# Patient Record
Sex: MALE | Race: WHITE | NOT HISPANIC OR LATINO | Employment: FULL TIME | URBAN - NONMETROPOLITAN AREA
[De-identification: names, ages, dates, MRNs, and addresses within clinical notes are randomized per-mention and may not be internally consistent; named-entity substitution may affect disease eponyms.]

---

## 2017-06-23 ENCOUNTER — OFFICE VISIT (OUTPATIENT)
Dept: INTERNAL MEDICINE | Facility: CLINIC | Age: 42
End: 2017-06-23

## 2017-06-23 VITALS
BODY MASS INDEX: 32.65 KG/M2 | WEIGHT: 233.19 LBS | DIASTOLIC BLOOD PRESSURE: 78 MMHG | TEMPERATURE: 97.7 F | OXYGEN SATURATION: 98 % | HEART RATE: 96 BPM | HEIGHT: 71 IN | RESPIRATION RATE: 16 BRPM | SYSTOLIC BLOOD PRESSURE: 126 MMHG

## 2017-06-23 DIAGNOSIS — R21 RASH OF NECK: ICD-10-CM

## 2017-06-23 DIAGNOSIS — Z76.89 ENCOUNTER TO ESTABLISH CARE: Primary | ICD-10-CM

## 2017-06-23 PROCEDURE — 99203 OFFICE O/P NEW LOW 30 MIN: CPT | Performed by: NURSE PRACTITIONER

## 2017-06-23 PROCEDURE — 90715 TDAP VACCINE 7 YRS/> IM: CPT | Performed by: NURSE PRACTITIONER

## 2017-06-23 PROCEDURE — 90471 IMMUNIZATION ADMIN: CPT | Performed by: NURSE PRACTITIONER

## 2017-06-23 NOTE — PROGRESS NOTES
Chief Complaint / Reason:      Chief Complaint   Patient presents with   • Establish Care     rash and bumps to neck       Subjective   Patient presents today to establish care and complaints of rash and bumps to neck. Patient denies fever, shortness of breath, chest pain, or heart palpitations.  Rash   This is a new problem. The current episode started in the past 7 days. The problem has been gradually worsening since onset. Location: back of neck. The rash is characterized by itchiness, redness and swelling. He was exposed to nothing (patent states that he does not know of anything that he has been exposed to but he and his family were outdoors recently  enjoying nature ). Pertinent negatives include no congestion, fatigue or fever. Past treatments include anti-itch cream. The treatment provided no relief.       History taken from: patient    PMH/FH/Social History were reviewed and updated appropriately in the electronic medical record.     Review of Systems:   Review of Systems   Constitutional: Negative.  Negative for fatigue and fever.   HENT: Negative.  Negative for congestion.    Eyes: Negative.    Respiratory: Negative.    Cardiovascular: Negative.    Gastrointestinal: Negative.    Endocrine: Negative.    Genitourinary: Negative.    Musculoskeletal: Negative.    Skin: Positive for rash.   Allergic/Immunologic: Negative.    Neurological: Negative.    Hematological: Negative.    Psychiatric/Behavioral: Negative.      All other systems were reviewed and are negative.  Exceptions are noted in the subjective or above.      Objective     Vital Signs  Vitals:    06/23/17 1621   BP: 126/78   Pulse: 96   Resp: 16   Temp: 97.7 °F (36.5 °C)   SpO2: 98%       Body mass index is 32.52 kg/(m^2).    Physical Exam   Constitutional: He is oriented to person, place, and time. He appears well-developed and well-nourished.   HENT:   Head: Normocephalic.   Eyes: Pupils are equal, round, and reactive to light.   Neck: Normal  range of motion. Neck supple.   Cardiovascular: Normal rate, regular rhythm, normal heart sounds and intact distal pulses.    Pulmonary/Chest: Effort normal and breath sounds normal.   Abdominal: Soft. Bowel sounds are normal. There is no tenderness.   Musculoskeletal: Normal range of motion.   Lymphadenopathy:     He has no cervical adenopathy.   Neurological: He is alert and oriented to person, place, and time.   Skin: Skin is warm and dry. Purpura and rash noted. There is erythema.        Psychiatric: He has a normal mood and affect. His behavior is normal. Judgment and thought content normal.   Nursing note and vitals reviewed.            Medication Review:     Current Outpatient Prescriptions:   •  triamcinolone (KENALOG) 0.1 % ointment, Apply  topically 2 (Two) Times a Day for 7 days., Disp: 15 g, Rfl: 0    Assessment/Plan   Shon was seen today for establish care.    Diagnoses and all orders for this visit:    Encounter to establish care  Counseling was given to patient for the following topics: instructions for management, risk factor reductions, patient and family education, risks and benefits of treatment options and importance of treatment compliance . Total time of the encounter was 30 minutes and 18 minutes was spend counseling.    --Nutrition: Stressed importance of moderation in sodium/caffeine intake, saturated fat and cholesterol, caloric balance, sufficient intake of fresh fruits, vegetables, fiber, calcium, iron, and 1 g folate supplementation if of childbearing age (if applicable).   --Discussed the issue of calcium supplement, and the daily use of baby aspirin (if applicable).  --Exercise: Stressed the importance of regular exercise and maintaining healthy weight.   --Substance Abuse: Discussed cessation/primary prevention of tobacco (if applicable), alcohol, or other drug use (if applicable); driving or other dangerous activities under the influence; availability of treatment for abuse.    --Injury prevention: Discussed safety belts, safety helmets, smoke detector, fall prevention.   --Dental health: Discussed importance of regular tooth brushing, flossing, and dental visits.  --Immunizations reviewed.  --Discussed benefits of health maintenance and its components.  --Stress management.  --Vision screening recommended    Rash of neck  -     triamcinolone (KENALOG) 0.1 % ointment; Apply  topically 2 (Two) Times a Day for 7 days.  Avoid scratching area and good handwashing advised.   Recommend patient take OTC antihistamines as needed.   Dicussed worsening signs and symptoms.   Other orders  -     Tdap Vaccine Greater Than or Equal To 6yo IM    Follow up in 1 year for annual physical and PRN.     Charlotte Mitchell, APRN  06/23/2017

## 2018-04-13 ENCOUNTER — OFFICE VISIT (OUTPATIENT)
Dept: INTERNAL MEDICINE | Facility: CLINIC | Age: 43
End: 2018-04-13

## 2018-04-13 ENCOUNTER — HOSPITAL ENCOUNTER (OUTPATIENT)
Dept: GENERAL RADIOLOGY | Facility: HOSPITAL | Age: 43
Discharge: HOME | End: 2018-04-13
Attending: NURSE PRACTITIONER | Admitting: NURSE PRACTITIONER

## 2018-04-13 VITALS
OXYGEN SATURATION: 97 % | HEIGHT: 71 IN | DIASTOLIC BLOOD PRESSURE: 92 MMHG | WEIGHT: 235.19 LBS | RESPIRATION RATE: 16 BRPM | HEART RATE: 92 BPM | SYSTOLIC BLOOD PRESSURE: 130 MMHG | BODY MASS INDEX: 32.93 KG/M2 | TEMPERATURE: 98.7 F

## 2018-04-13 DIAGNOSIS — Z82.49 FAMILY HISTORY OF HEART DISEASE: ICD-10-CM

## 2018-04-13 DIAGNOSIS — Z83.49 FAMILY HISTORY OF ENDOCRINE DISORDER: ICD-10-CM

## 2018-04-13 DIAGNOSIS — E66.9 CLASS 1 OBESITY WITH BODY MASS INDEX (BMI) OF 32.0 TO 32.9 IN ADULT, UNSPECIFIED OBESITY TYPE, UNSPECIFIED WHETHER SERIOUS COMORBIDITY PRESENT: ICD-10-CM

## 2018-04-13 DIAGNOSIS — R60.0 EDEMA OF LEFT LOWER EXTREMITY: ICD-10-CM

## 2018-04-13 DIAGNOSIS — R06.83 SNORING: Primary | ICD-10-CM

## 2018-04-13 DIAGNOSIS — R07.9 CHEST PAIN AT REST: ICD-10-CM

## 2018-04-13 DIAGNOSIS — R53.83 FATIGUE, UNSPECIFIED TYPE: ICD-10-CM

## 2018-04-13 PROCEDURE — 71046 X-RAY EXAM CHEST 2 VIEWS: CPT

## 2018-04-13 PROCEDURE — 99214 OFFICE O/P EST MOD 30 MIN: CPT | Performed by: NURSE PRACTITIONER

## 2018-04-13 PROCEDURE — 93000 ELECTROCARDIOGRAM COMPLETE: CPT | Performed by: NURSE PRACTITIONER

## 2018-04-13 RX ORDER — ERGOCALCIFEROL 1.25 MG/1
50000 CAPSULE ORAL WEEKLY
Qty: 4 CAPSULE | Refills: 2 | Status: SHIPPED | OUTPATIENT
Start: 2018-04-13 | End: 2022-02-10

## 2018-04-13 NOTE — TELEPHONE ENCOUNTER
Wife called to schedule an appt. Pt. told her that he has had some left arm pain and some chest pain. As far as she knows, no SOA, jaw pain etc. She doesn't think that he needs to go to the ER. I went ahead and schedule the appt.

## 2018-04-13 NOTE — PROGRESS NOTES
Please contact patient and let him know lab results and x-ray results.  Informed him that some of the labs are not back and that his vitamin D level is low we can send in medication or he can take over-the-counter vitamin D.  Also LDL which is bad cholesterol is elevated and needs to be a little below 100 and he needs to do dietary modifications to improve along with exercise.  Recommend low-fat low carbohydrate low cholesterol diet.

## 2018-04-14 LAB
25(OH)D3+25(OH)D2 SERPL-MCNC: <12.8 NG/ML
ALBUMIN SERPL-MCNC: 4.3 G/DL (ref 3.5–5)
ALBUMIN/GLOB SERPL: 1.2 G/DL (ref 1–2)
ALP SERPL-CCNC: 68 U/L (ref 38–126)
ALT SERPL-CCNC: 66 U/L (ref 13–69)
AST SERPL-CCNC: 38 U/L (ref 15–46)
BASOPHILS # BLD AUTO: 0.03 10*3/MM3 (ref 0–0.2)
BASOPHILS NFR BLD AUTO: 0.4 % (ref 0–2.5)
BILIRUB SERPL-MCNC: 0.5 MG/DL (ref 0.2–1.3)
BUN SERPL-MCNC: 16 MG/DL (ref 7–20)
BUN/CREAT SERPL: 17.8 (ref 6.3–21.9)
CALCIUM SERPL-MCNC: 9.9 MG/DL (ref 8.4–10.2)
CHLORIDE SERPL-SCNC: 103 MMOL/L (ref 98–107)
CHOLEST SERPL-MCNC: 184 MG/DL (ref 0–199)
CO2 SERPL-SCNC: 26 MMOL/L (ref 26–30)
CREAT SERPL-MCNC: 0.9 MG/DL (ref 0.6–1.3)
EOSINOPHIL # BLD AUTO: 0.22 10*3/MM3 (ref 0–0.7)
EOSINOPHIL NFR BLD AUTO: 3.2 % (ref 0–7)
ERYTHROCYTE [DISTWIDTH] IN BLOOD BY AUTOMATED COUNT: 12.8 % (ref 11.5–14.5)
GFR SERPLBLD CREATININE-BSD FMLA CKD-EPI: 112 ML/MIN/1.73
GFR SERPLBLD CREATININE-BSD FMLA CKD-EPI: 93 ML/MIN/1.73
GLOBULIN SER CALC-MCNC: 3.5 GM/DL
GLUCOSE SERPL-MCNC: 91 MG/DL (ref 74–98)
HBA1C MFR BLD: 5.4 %
HCT VFR BLD AUTO: 43.4 % (ref 42–52)
HDLC SERPL-MCNC: 29 MG/DL (ref 40–60)
HGB BLD-MCNC: 14.5 G/DL (ref 14–18)
IMM GRANULOCYTES # BLD: 0.03 10*3/MM3 (ref 0–0.06)
IMM GRANULOCYTES NFR BLD: 0.4 % (ref 0–0.6)
LDLC SERPL CALC-MCNC: 114 MG/DL (ref 0–99)
LYMPHOCYTES # BLD AUTO: 2.51 10*3/MM3 (ref 0.6–3.4)
LYMPHOCYTES NFR BLD AUTO: 36.9 % (ref 10–50)
MCH RBC QN AUTO: 30 PG (ref 27–31)
MCHC RBC AUTO-ENTMCNC: 33.4 G/DL (ref 30–37)
MCV RBC AUTO: 89.9 FL (ref 80–94)
MONOCYTES # BLD AUTO: 0.43 10*3/MM3 (ref 0–0.9)
MONOCYTES NFR BLD AUTO: 6.3 % (ref 0–12)
NEUTROPHILS # BLD AUTO: 3.59 10*3/MM3 (ref 2–6.9)
NEUTROPHILS NFR BLD AUTO: 52.8 % (ref 37–80)
NRBC BLD AUTO-RTO: 0 /100 WBC (ref 0–0)
PLATELET # BLD AUTO: 265 10*3/MM3 (ref 130–400)
POTASSIUM SERPL-SCNC: 4.4 MMOL/L (ref 3.5–5.1)
PROT SERPL-MCNC: 7.8 G/DL (ref 6.3–8.2)
RBC # BLD AUTO: 4.83 10*6/MM3 (ref 4.7–6.1)
SODIUM SERPL-SCNC: 144 MMOL/L (ref 137–145)
T4 FREE SERPL-MCNC: 0.97 NG/DL (ref 0.78–2.19)
TRIGL SERPL-MCNC: 207 MG/DL
TROPONIN I SERPL-MCNC: <0.01 NG/ML (ref 0–0.04)
TSH SERPL DL<=0.005 MIU/L-ACNC: 2.49 MIU/ML (ref 0.47–4.68)
VLDLC SERPL CALC-MCNC: 41.4 MG/DL
WBC # BLD AUTO: 6.81 10*3/MM3 (ref 4.8–10.8)

## 2018-04-17 NOTE — PROGRESS NOTES
Chief Complaint / Reason:      Chief Complaint   Patient presents with   • Arm Pain     left, at the elbow. Off and on x 1 year, worse the last 3 days. Would like to get an xray of lungs as he is former smoker.    • Chest Pain     on the left, and around left shoulder blade.       Subjective     HPI  Patient presents today for chest pain, left arm pain and shoulder pain. Patient denies,headaches, weakness, visual disturbances or confusion. He states that he has been under a lot of stress and has gained some weight. He knows he is out of shape and he does not exercise daily nor eat a healthy diet. His wife called today to make him an appointment as she was concerned about him and didn't think he needed to go to the ER at that time. He states that he does have some lower extremity edema. He report some occasional reflux. Denies any substance abuse. He states he does snore a lot at night and does wake up regularly tired even after sleeping.     History taken from: patient    PMH/FH/Social History were reviewed and updated appropriately in the electronic medical record.     Review of Systems:   Review of Systems   Constitutional: Positive for fatigue.   Respiratory: Positive for chest tightness and shortness of breath.    Cardiovascular: Positive for chest pain and leg swelling.   Gastrointestinal: Negative.    Musculoskeletal: Positive for arthralgias and myalgias.   Skin: Negative.    Psychiatric/Behavioral: Negative.      All other systems were reviewed and are negative.  Exceptions are noted in the subjective or above.      Objective     Vital Signs  Vitals:    04/13/18 1002   BP: 130/92   Pulse: 92   Resp: 16   Temp: 98.7 °F (37.1 °C)   SpO2: 97%       Body mass index is 32.8 kg/m².    Physical Exam   Constitutional: He is oriented to person, place, and time. He appears well-developed and well-nourished.   Cardiovascular: Normal rate, regular rhythm, normal heart sounds and intact distal pulses.     Pulmonary/Chest: Effort normal and breath sounds normal. No respiratory distress. He has no wheezes. He exhibits no tenderness.   Abdominal: Soft. Bowel sounds are normal.   Musculoskeletal: He exhibits edema and tenderness.   Neurological: He is alert and oriented to person, place, and time.   Skin: Skin is warm and dry.   Psychiatric: He has a normal mood and affect. His behavior is normal. Judgment and thought content normal.   Nursing note and vitals reviewed.       Results Review:    I reviewed the patient's new clinical results.     ECG 12 Lead  Date/Time: 4/13/2018 10:20 AM  Performed by: CHARLOTTE MITCHELL  Authorized by: CHARLOTTE MITCHELL   Comparison: not compared with previous ECG   Rhythm: sinus rhythm  Rate: normal  BPM: 88  Clinical impression: normal ECG            Medication Review:     Current Outpatient Prescriptions:   •  vitamin D (ERGOCALCIFEROL) 35921 units capsule capsule, Take 1 capsule by mouth 1 (One) Time Per Week., Disp: 4 capsule, Rfl: 2    Assessment/Plan   Shon was seen today for arm pain and chest pain.    Diagnoses and all orders for this visit:    Snoring  -     XR Chest PA & Lateral    Family history of heart disease  -     Lipid Panel    Class 1 obesity with body mass index (BMI) of 32.0 to 32.9 in adult, unspecified obesity type, unspecified whether serious comorbidity present  -     Vitamin D 25 Hydroxy    Family history of endocrine disorder  -     TSH  -     T4, Free  -     Comprehensive Metabolic Panel  -     Hemoglobin A1c    Fatigue, unspecified type  -     Vitamin D 25 Hydroxy  -     CBC & Differential    Edema of left lower extremity  -     Comprehensive Metabolic Panel  -     CBC & Differential  -     XR Chest PA & Lateral    Chest pain at rest  -     XR Chest PA & Lateral  -     Troponin I    Discussed worsening s/s of chest pain and when to seek medical attention.     Return in about 4 weeks (around 5/11/2018).    Charlotte Mitchell, RUBI  04/13/2018

## 2022-02-10 ENCOUNTER — OFFICE VISIT (OUTPATIENT)
Dept: ORTHOPEDIC SURGERY | Facility: CLINIC | Age: 47
End: 2022-02-10

## 2022-02-10 VITALS — HEIGHT: 71 IN | BODY MASS INDEX: 35.11 KG/M2 | WEIGHT: 250.8 LBS | TEMPERATURE: 97.6 F

## 2022-02-10 DIAGNOSIS — M25.511 ARTHRALGIA OF RIGHT SHOULDER REGION: Primary | ICD-10-CM

## 2022-02-10 DIAGNOSIS — S49.91XA SHOULDER INJURY, RIGHT, INITIAL ENCOUNTER: ICD-10-CM

## 2022-02-10 DIAGNOSIS — M75.41 IMPINGEMENT SYNDROME OF RIGHT SHOULDER: ICD-10-CM

## 2022-02-10 PROCEDURE — 99203 OFFICE O/P NEW LOW 30 MIN: CPT | Performed by: PHYSICIAN ASSISTANT

## 2022-02-10 NOTE — PROGRESS NOTES
Subjective   Patient ID: Shon Adames is a 47 y.o. right hand dominant male  Pain of the Right Shoulder (States he fell and tried to catch himself 02/2021, and it has been hurting off and on since then, has gotten worse in the last few months.)         History of Present Illness  Patient presents with right shoulder pain that began after a near fall that caused him to twist his arm while trying to catch himself. DOI 02/2021.   Pain ( sharp throbbing) has been more constant over the last month.  Denies constant numbness or tingling.                                                  Past Medical History:   Diagnosis Date   • Fracture    • Gout    • Kidney infection         Past Surgical History:   Procedure Laterality Date   • AMPUTATION Left    • APPENDECTOMY         Family History   Problem Relation Age of Onset   • Arthritis Mother    • Diabetes Mother    • Lupus Mother    • Heart attack Mother    • Hypertension Mother    • Hyperlipidemia Mother    • Cancer Paternal Grandfather        Social History     Socioeconomic History   • Marital status:    Tobacco Use   • Smoking status: Former Smoker   • Smokeless tobacco: Never Used   Substance and Sexual Activity   • Alcohol use: Yes     Comment: rarely   • Drug use: Yes     Types: Marijuana   • Sexual activity: Defer       No current outpatient medications on file.    No Known Allergies    Review of Systems   Constitutional: Negative for fever.   HENT: Negative for dental problem and voice change.    Eyes: Negative for visual disturbance.   Respiratory: Negative for shortness of breath.    Cardiovascular: Negative for chest pain.   Gastrointestinal: Negative for abdominal pain.   Genitourinary: Negative for dysuria.   Musculoskeletal: Positive for arthralgias (right shoulder). Negative for gait problem and joint swelling.   Skin: Negative for rash.   Neurological: Negative for speech difficulty.   Hematological: Does not bruise/bleed easily.  "  Psychiatric/Behavioral: Negative for confusion.       I have reviewed the medical and surgical history, family history, social history, medications, and/or allergies, and the review of systems of this report.    Objective   Temp 97.6 °F (36.4 °C)   Ht 180.3 cm (70.98\")   Wt 114 kg (250 lb 12.8 oz)   BMI 35.00 kg/m²    Physical Exam  Vitals and nursing note reviewed.   Constitutional:       Appearance: Normal appearance.   Pulmonary:      Effort: Pulmonary effort is normal.   Neurological:      Mental Status: He is alert and oriented to person, place, and time.       Right Shoulder Exam     Tenderness   The patient is experiencing tenderness in the biceps tendon.    Range of Motion   Active abduction: 120   Right shoulder forward flexion: 125.   Internal rotation 90 degrees: 60     Muscle Strength   Supraspinatus: 4/5   Subscapularis: 4/5   Biceps: 4/5     Tests   Cross arm: positive  Impingement: positive  Drop arm: positive              Neurologic Exam     Mental Status   Oriented to person, place, and time.           + Otoe's test Rue       Assessment/Plan   Independent Review of Radiographic Studies:    X-ray of the right shoulder 2 view performed in the office independently reviewed for the evaluation of shoulder pain.  No comparison films available to me.  No acute fracture.  The acromiohumeral height is shortened at approximate 1.9 mm.  There does appear to be downward sloping of the acromion to suggest impingement      Procedures       Diagnoses and all orders for this visit:    1. Arthralgia of right shoulder region (Primary)  -     XR Shoulder 2+ View Right; Future  -     MRI Shoulder Right Without Contrast    2. Shoulder injury, right, initial encounter  -     MRI Shoulder Right Without Contrast    3. Impingement syndrome of right shoulder  -     MRI Shoulder Right Without Contrast       Discussion of orthopedic goals  Risk, benefits, and merits of treatment alternatives reviewed with the patient " and questions answered  Avoid offending activity  Ice, heat, and/or modalities as beneficial    Recommendations/Plan:  Exercise, medications, injections, other patient advice, and return appointment as noted.  Patient is encouraged to call or return for any issues or concerns.      Patient agreeable to call or return sooner for any concerns.               EMR Dragon-transcription disclaimer:  This encounter note is an electronic transcription of spoken language to printed text.  Electronic transcription of spoken language may permit erroneous or at times nonsensical words or phrases to be inadvertently transcribed.  Although I have reviewed the note for such errors, some may still exist

## 2022-03-04 ENCOUNTER — HOSPITAL ENCOUNTER (OUTPATIENT)
Dept: MRI IMAGING | Facility: HOSPITAL | Age: 47
Discharge: HOME | End: 2022-03-04
Admitting: PHYSICIAN ASSISTANT

## 2022-03-04 PROCEDURE — 73221 MRI JOINT UPR EXTREM W/O DYE: CPT

## 2022-03-19 PROCEDURE — 99283 EMERGENCY DEPT VISIT LOW MDM: CPT

## 2022-03-20 ENCOUNTER — HOSPITAL ENCOUNTER (EMERGENCY)
Facility: HOSPITAL | Age: 47
Discharge: HOME | End: 2022-03-20
Attending: EMERGENCY MEDICINE | Admitting: EMERGENCY MEDICINE

## 2022-03-20 ENCOUNTER — APPOINTMENT (OUTPATIENT)
Dept: GENERAL RADIOLOGY | Facility: HOSPITAL | Age: 47
End: 2022-03-20

## 2022-03-20 VITALS
RESPIRATION RATE: 16 BRPM | SYSTOLIC BLOOD PRESSURE: 157 MMHG | WEIGHT: 250.6 LBS | BODY MASS INDEX: 33.94 KG/M2 | OXYGEN SATURATION: 100 % | TEMPERATURE: 97.6 F | DIASTOLIC BLOOD PRESSURE: 78 MMHG | HEART RATE: 84 BPM | HEIGHT: 72 IN

## 2022-03-20 DIAGNOSIS — S20.219A CONTUSION OF RIB, UNSPECIFIED LATERALITY, INITIAL ENCOUNTER: Primary | ICD-10-CM

## 2022-03-20 PROCEDURE — 71111 X-RAY EXAM RIBS/CHEST4/> VWS: CPT

## 2022-03-20 RX ORDER — HYDROCODONE BITARTRATE AND ACETAMINOPHEN 10; 325 MG/1; MG/1
1 TABLET ORAL ONCE
Status: COMPLETED | OUTPATIENT
Start: 2022-03-20 | End: 2022-03-20

## 2022-03-20 RX ORDER — HYDROCODONE BITARTRATE AND ACETAMINOPHEN 5; 325 MG/1; MG/1
1 TABLET ORAL EVERY 6 HOURS PRN
Qty: 12 TABLET | Refills: 0 | Status: SHIPPED | OUTPATIENT
Start: 2022-03-20 | End: 2023-01-20

## 2022-03-20 RX ADMIN — HYDROCODONE BITARTRATE AND ACETAMINOPHEN 1 TABLET: 10; 325 TABLET ORAL at 00:58

## 2022-03-28 ENCOUNTER — TELEPHONE (OUTPATIENT)
Dept: ORTHOPEDIC SURGERY | Facility: CLINIC | Age: 47
End: 2022-03-28

## 2022-03-28 NOTE — TELEPHONE ENCOUNTER
Provider: LORRI NERI    Caller: RORO JOEL    Relationship to Patient: SELF    Phone Number: [phone]    Reason for Call: PT HAD MRI OF RIGHT SHOULDER DONE ON 3/5, SAID HE HASN'T HEARD BACK FROM ANYONE SINCE THEN. HE IS NOT SURE IF HE IS SUPPOSED TO COME BACK FOR AN APPT OR WAIT FOR A CALL WITH RESULTS. PLEASE CALL HIM BACK AT THE NUMBER ABOVE.

## 2022-03-30 ENCOUNTER — PREP FOR SURGERY (OUTPATIENT)
Dept: OTHER | Facility: HOSPITAL | Age: 47
End: 2022-03-30

## 2022-03-30 ENCOUNTER — OFFICE VISIT (OUTPATIENT)
Dept: ORTHOPEDIC SURGERY | Facility: CLINIC | Age: 47
End: 2022-03-30

## 2022-03-30 VITALS — HEIGHT: 72 IN | BODY MASS INDEX: 33.9 KG/M2 | WEIGHT: 250.3 LBS | TEMPERATURE: 97.4 F

## 2022-03-30 DIAGNOSIS — S43.431A TEAR OF RIGHT GLENOID LABRUM, INITIAL ENCOUNTER: ICD-10-CM

## 2022-03-30 DIAGNOSIS — S49.91XA SHOULDER INJURY, RIGHT, INITIAL ENCOUNTER: Primary | ICD-10-CM

## 2022-03-30 DIAGNOSIS — M25.511 ARTHRALGIA OF RIGHT SHOULDER REGION: Primary | ICD-10-CM

## 2022-03-30 DIAGNOSIS — M25.511 ARTHRALGIA OF RIGHT SHOULDER REGION: ICD-10-CM

## 2022-03-30 DIAGNOSIS — S49.91XA SHOULDER INJURY, RIGHT, INITIAL ENCOUNTER: ICD-10-CM

## 2022-03-30 PROCEDURE — 99213 OFFICE O/P EST LOW 20 MIN: CPT | Performed by: PHYSICIAN ASSISTANT

## 2022-03-30 RX ORDER — CEFAZOLIN SODIUM 2 G/50ML
2 SOLUTION INTRAVENOUS
Status: CANCELLED | OUTPATIENT
Start: 2022-03-31 | End: 2022-04-01

## 2022-03-30 NOTE — PROGRESS NOTES
Subjective   Patient ID: Shon Adames is a 47 y.o. right hand dominant male  Follow-up of the Right Shoulder (MRI results.)         History of Present Illness  Patient is following up to review MRI results of the right shoulder.  He is still experiencing significant right shoulder pain that is limiting his activities of daily living.  Patient began having right shoulder pain after a near fall that caused him to twist his right arm in the process of catching himself.  Date of injury February 2021.  Patient did not come into the clinic until February 2022 for initial evaluation  Patient is not having numbness or tingling to the right upper extremity.  He does experience sharp throbbing pain to the right arm worse with movement.    Past Medical History:   Diagnosis Date   • Fracture    • Gout    • Kidney infection         Past Surgical History:   Procedure Laterality Date   • AMPUTATION Left    • APPENDECTOMY         Family History   Problem Relation Age of Onset   • Arthritis Mother    • Diabetes Mother    • Lupus Mother    • Heart attack Mother    • Hypertension Mother    • Hyperlipidemia Mother    • Cancer Paternal Grandfather        Social History     Socioeconomic History   • Marital status:    Tobacco Use   • Smoking status: Former Smoker   • Smokeless tobacco: Never Used   Substance and Sexual Activity   • Alcohol use: Yes     Comment: rarely   • Drug use: Yes     Types: Marijuana   • Sexual activity: Defer         Current Outpatient Medications:   •  HYDROcodone-acetaminophen (NORCO) 5-325 MG per tablet, Take 1 tablet by mouth Every 6 (Six) Hours As Needed for Severe Pain ., Disp: 12 tablet, Rfl: 0    No Known Allergies    Review of Systems   Constitutional: Negative for fever.   HENT: Negative for dental problem and voice change.    Eyes: Negative for visual disturbance.   Respiratory: Negative for shortness of breath.    Cardiovascular: Negative for chest pain.   Gastrointestinal: Negative for abdominal  "pain.   Genitourinary: Negative for dysuria.   Musculoskeletal: Positive for arthralgias (right shoulder). Negative for gait problem and joint swelling.   Skin: Negative for rash.   Neurological: Negative for speech difficulty.   Hematological: Does not bruise/bleed easily.   Psychiatric/Behavioral: Negative for confusion.       I have reviewed the medical and surgical history, family history, social history, medications, and/or allergies, and the review of systems of this report.    Objective   Temp 97.4 °F (36.3 °C)   Ht 182.9 cm (72.01\")   Wt 114 kg (250 lb 4.8 oz)   BMI 33.94 kg/m²    Physical Exam  Vitals and nursing note reviewed.   Constitutional:       Appearance: Normal appearance.   Cardiovascular:      Pulses: Normal pulses.   Pulmonary:      Effort: Pulmonary effort is normal.      Breath sounds: Normal breath sounds.   Abdominal:      General: There is no distension.   Musculoskeletal:      Right shoulder: Tenderness present. No deformity.   Neurological:      Mental Status: He is alert and oriented to person, place, and time.   Psychiatric:         Behavior: Behavior normal.       Right Shoulder Exam     Range of Motion   Right shoulder active abduction: 115.   Right shoulder forward flexion: 115.   Internal rotation 0 degrees: Sacrum   Internal rotation 90 degrees: 60     Muscle Strength   The patient has normal right shoulder strength.    Tests   Drop arm: negative    Other   Erythema: absent  Sensation: normal  Pulse: present    Comments:  + [place]'s test RUE             Neurologic Exam     Mental Status   Oriented to person, place, and time.              Assessment/Plan   Independent Review of Radiographic Studies:    No new imaging done today.  Narrative & Impression   PROCEDURE: MRI SHOULDER RIGHT WITHOUT CONTRAST-     TECHNIQUE: Multiplanar MR without contrast.     HISTORY: Shoulder pain, rotator cuff disorder suspected, xray done     FINDINGS:     MARROW SIGNAL PATTERN: Unremarkable.   "   GLENOHUMERAL JOINT: Small joint effusion is seen. There is some edema  within the rotator cuff interval. This can be seen with adhesive  capsulitis, although not specific.     AC JOINT: Trace fluid is noted in the subacromial bursa compatible with  mild bursitis. Mild AC joint arthropathy is present without evidence of  AC joint injury.     LABRUM: Tear is noted involving most of the superior labrum. This is  well seen on the coronal fat-suppressed T2 sequences. Anterior and  posterior labrum are intact.     ROTATOR CUFF APPARATUS: No evidence of tear.     BICEPS TENDON:  Intraarticular long head tendon intact        IMPRESSION:  1. No acute osseous abnormality or evidence of rotator cuff tear.  2. Longitudinal tear involving the entire superior labrum.  3. Edema of the rotator cuff interval can be seen with adhesive  capsulitis. Correlate with clinical presentation.     This report was signed and finalized on 3/4/2022 2:04 PM by Pankaj Mcguire MD.         Procedures       Diagnoses and all orders for this visit:    1. Arthralgia of right shoulder region (Primary)    2. Shoulder injury, right, initial encounter    3. Tear of right glenoid labrum, initial encounter       Risk, benefits, and merits of treatment alternatives reviewed with the patient and questions answered  The nature of the proposed surgery reviewed with the patient including risks, benefits, rehabilitation, recovery timeframe, and outcome expectations    Recommendations/Plan:  Patient is encouraged to call or return for any issues or concerns.    Patient would like to proceed with right shoulder arthroscopy versus cortisone therapy and formal physical therapy initially.      Plan: Right shoulder arthroscopy with labral debridement versus repair and related procedures    Patient agreeable to call or return sooner for any concerns.           EMR Dragon-transcription disclaimer:  This encounter note is an electronic transcription of spoken language to  printed text.  Electronic transcription of spoken language may permit erroneous or at times nonsensical words or phrases to be inadvertently transcribed.  Although I have reviewed the note for such errors, some may still exist

## 2022-03-31 PROBLEM — M25.511 ARTHRALGIA OF RIGHT SHOULDER REGION: Status: ACTIVE | Noted: 2022-03-31

## 2022-03-31 PROBLEM — S49.91XA SHOULDER INJURY, RIGHT, INITIAL ENCOUNTER: Status: ACTIVE | Noted: 2022-03-31

## 2022-03-31 PROBLEM — S43.431A TEAR OF RIGHT GLENOID LABRUM: Status: ACTIVE | Noted: 2022-03-31

## 2022-04-05 ENCOUNTER — HOSPITAL ENCOUNTER (OUTPATIENT)
Dept: CT IMAGING | Facility: HOSPITAL | Age: 47
Discharge: HOME | End: 2022-04-05
Admitting: EMERGENCY MEDICINE

## 2022-04-05 ENCOUNTER — TRANSCRIBE ORDERS (OUTPATIENT)
Dept: CT IMAGING | Facility: HOSPITAL | Age: 47
End: 2022-04-05

## 2022-04-05 DIAGNOSIS — M94.0 TIETZE'S DISEASE: ICD-10-CM

## 2022-04-05 DIAGNOSIS — M94.0 TIETZE'S DISEASE: Primary | ICD-10-CM

## 2022-04-05 PROCEDURE — 74176 CT ABD & PELVIS W/O CONTRAST: CPT

## 2022-04-06 NOTE — ED PROVIDER NOTES
Subjective   History of Present Illness    Chief Complaint: Chest pain status post fall  History of Present Illness: 47-year-old male was competing in a [employer] match earlier, tripped and fell landing on [employer] magazine struck to his chest.  Complains of bilateral anterior chest pain  Onset: Today this afternoon  Duration: Persistent  Exacerbating / Alleviating factors: Worse with movement deep breathing  Associated symptoms: None      Nurses Notes reviewed and agree, including vitals, allergies, social history and prior medical history.     REVIEW OF SYSTEMS: All systems reviewed and not pertinent unless noted.    Positive for: Anterior chest pain status post fall    Negative for: Hemoptysis syncope palpitations  Review of Systems    Past Medical History:   Diagnosis Date   • Fracture    • Gout    • Kidney infection        No Known Allergies    Past Surgical History:   Procedure Laterality Date   • AMPUTATION Left    • APPENDECTOMY         Family History   Problem Relation Age of Onset   • Arthritis Mother    • Diabetes Mother    • Lupus Mother    • Heart attack Mother    • Hypertension Mother    • Hyperlipidemia Mother    • Cancer Paternal Grandfather        Social History     Socioeconomic History   • Marital status:    Tobacco Use   • Smoking status: Former Smoker   • Smokeless tobacco: Never Used   Substance and Sexual Activity   • Alcohol use: Yes     Comment: rarely   • Drug use: Yes     Types: Marijuana   • Sexual activity: Defer           Objective   Physical Exam    CONSTITUTIONAL: Well developed, nontoxic 47-year-old male,  in no acute distress.  VITAL SIGNS: per nursing, reviewed and noted  SKIN: exposed skin with no rashes, ulcerations or petechiae.  EYES: perrla. EOMI.  ENT: Normal voice.  Patient maintained wearing a mask throughout patient encounter due to coronavirus pandemic  RESPIRATORY:  No increased work of breathing. No retractions.   CARDIOVASCULAR:  regular rate and rhythm, no  murmurs.  Good Peripheral pulses. Good cap refill to extremities.   GI: Abdomen soft, nontender, normal bowel sounds. No hernia. No ascites.  MUSCULOSKELETAL: Bilateral anterior chest tenderness palpation without crepitus.  Full ROM. Strength and tone grossly normal.  no spasms. no neck or back tenderness or spasm.   NEUROLOGIC: Alert, oriented x 3. No gross deficits. GCS 15.   PSYCH: appropriate affect.  : no bladder tenderness or distention, no CVA tenderness      Procedures     No attending physician procedures were performed on this patient.      ED Course      Bilateral rib series interpreted by me reveals no cardiomegaly no effusion no infiltrate no pneumothorax no acute bony abnormality.                                           MDM  Patient presented with chest wall pain status post fall.  Treated with [place] in emergency department Ricardo reviewed short course prescription of Norco for pain.  Outpatient follow-up precautions discussed.  Final diagnoses:   Contusion of rib, unspecified laterality, initial encounter       ED Disposition  ED Disposition     ED Disposition   Discharge    Condition   Stable    Comment   --             Charlotte Mitchell, APRN  [address] [facility] [address]  [facility] [address] [phone]          [facility]  [address] [address] [phone]    As needed, If symptoms worsen         Medication List      New Prescriptions    HYDROcodone-acetaminophen 5-325 MG per tablet  Commonly known as: NORCO  Take 1 tablet by mouth Every 6 (Six) Hours As Needed for Severe Pain .           Where to Get Your Medications      These medications were sent to [facility] - [address] [place] [address] - [phone]  - [phone] [address] [address]    Phone: [phone]   · HYDROcodone-acetaminophen 5-325 MG per tablet          Jasiel Arellano DO  04/06/22 040

## 2022-04-11 ENCOUNTER — APPOINTMENT (OUTPATIENT)
Dept: PREADMISSION TESTING | Facility: HOSPITAL | Age: 47
End: 2022-04-11

## 2022-06-08 ENCOUNTER — TRANSCRIBE ORDERS (OUTPATIENT)
Dept: GENERAL RADIOLOGY | Facility: HOSPITAL | Age: 47
End: 2022-06-08

## 2022-06-08 ENCOUNTER — HOSPITAL ENCOUNTER (OUTPATIENT)
Dept: GENERAL RADIOLOGY | Facility: HOSPITAL | Age: 47
Discharge: HOME | End: 2022-06-08
Admitting: NURSE PRACTITIONER

## 2022-06-08 DIAGNOSIS — Z01.818 PRE-OPERATIVE CLEARANCE: ICD-10-CM

## 2022-06-08 DIAGNOSIS — Z01.818 PRE-OPERATIVE CLEARANCE: Primary | ICD-10-CM

## 2022-06-08 PROCEDURE — 71046 X-RAY EXAM CHEST 2 VIEWS: CPT

## 2023-01-19 ENCOUNTER — HOSPITAL ENCOUNTER (EMERGENCY)
Facility: HOSPITAL | Age: 48
Discharge: HOME | End: 2023-01-19
Attending: STUDENT IN AN ORGANIZED HEALTH CARE EDUCATION/TRAINING PROGRAM | Admitting: STUDENT IN AN ORGANIZED HEALTH CARE EDUCATION/TRAINING PROGRAM
Payer: COMMERCIAL

## 2023-01-19 ENCOUNTER — APPOINTMENT (OUTPATIENT)
Dept: GENERAL RADIOLOGY | Facility: HOSPITAL | Age: 48
End: 2023-01-19
Payer: COMMERCIAL

## 2023-01-19 VITALS
HEIGHT: 72 IN | RESPIRATION RATE: 16 BRPM | BODY MASS INDEX: 31.83 KG/M2 | SYSTOLIC BLOOD PRESSURE: 130 MMHG | DIASTOLIC BLOOD PRESSURE: 84 MMHG | TEMPERATURE: 98.2 F | HEART RATE: 97 BPM | WEIGHT: 235 LBS | OXYGEN SATURATION: 98 %

## 2023-01-19 DIAGNOSIS — S62.629A CLOSED AVULSION FRACTURE OF MIDDLE PHALANX OF FINGER, INITIAL ENCOUNTER: Primary | ICD-10-CM

## 2023-01-19 PROCEDURE — 73140 X-RAY EXAM OF FINGER(S): CPT

## 2023-01-19 PROCEDURE — 99283 EMERGENCY DEPT VISIT LOW MDM: CPT

## 2023-01-19 NOTE — ED PROVIDER NOTES
Subjective  History of Present Illness:    Patient is a 47-year-old male with no significant medical history who presents with pain to his fourth left digit.  Patient states that he was walking through mud when he slipped and fell onto his left hand.  States that he saw his finger dislocated and put this back into place himself.  States he took a few more steps and then slipped again.  Says that he once again had a deformity to his fourth digit of his left hand and once again performed his arm reduction.  States that he is have persistent pain after both of these episodes and thus presents to our emergency department for evaluation.  No obvious deformity on presentation.  He denies pain to any other extremity area.  Denies hitting his head, denies loss of consciousness.  States that he was both mechanical falls secondary to slipping in the mud.  Denies any chest pain or shortness of breath preceding these episodes.  Members following.  Otherwise, denies any preceding medical complaints.    Nurses Notes reviewed and agree, including vitals, allergies, social history and prior medical history.     REVIEW OF SYSTEMS: All systems reviewed and not pertinent unless noted.  Review of Systems   Constitutional: Negative for activity change, appetite change, chills, fatigue and fever.   HENT: Negative for congestion, sinus pressure, sneezing and trouble swallowing.    Eyes: Negative for discharge and itching.   Respiratory: Negative for cough and shortness of breath.    Cardiovascular: Negative for chest pain and palpitations.   Gastrointestinal: Negative for abdominal distention and abdominal pain.   Endocrine: Negative for cold intolerance and heat intolerance.   Genitourinary: Negative for decreased urine volume, dysuria and urgency.   Musculoskeletal: Positive for arthralgias. Negative for gait problem, neck pain and neck stiffness.   Skin: Negative for color change and rash.   Allergic/Immunologic: Negative for  "immunocompromised state.   Neurological: Negative for facial asymmetry and headaches.   Hematological: Negative for adenopathy.   Psychiatric/Behavioral: Negative for self-injury and suicidal ideas.       Past Medical History:   Diagnosis Date   • Fracture    • Gout    • Kidney infection        Allergies:    Patient has no known allergies.      Past Surgical History:   Procedure Laterality Date   • AMPUTATION Left    • APPENDECTOMY           Social History     Socioeconomic History   • Marital status:    Tobacco Use   • Smoking status: Former   • Smokeless tobacco: Never   Substance and Sexual Activity   • Alcohol use: Yes     Comment: rarely   • Drug use: Yes     Types: Marijuana   • Sexual activity: Defer         Family History   Problem Relation Age of Onset   • Arthritis Mother    • Diabetes Mother    • Lupus Mother    • Heart attack Mother    • Hypertension Mother    • Hyperlipidemia Mother    • Cancer Paternal Grandfather        Objective  Physical Exam:  /84 (BP Location: Left arm, Patient Position: Sitting)   Pulse 97   Temp 98.2 °F (36.8 °C) (Oral)   Resp 16   Ht 182.9 cm (72\")   Wt 107 kg (235 lb)   SpO2 98%   BMI 31.87 kg/m²      Physical Exam  Constitutional:       General: He is not in acute distress.     Appearance: Normal appearance. He is normal weight. He is not ill-appearing.   HENT:      Head: Normocephalic and atraumatic.      Nose: Nose normal. No congestion or rhinorrhea.      Mouth/Throat:      Mouth: Mucous membranes are moist.      Pharynx: Oropharynx is clear.   Eyes:      Extraocular Movements: Extraocular movements intact.      Conjunctiva/sclera: Conjunctivae normal.      Pupils: Pupils are equal, round, and reactive to light.   Cardiovascular:      Rate and Rhythm: Normal rate and regular rhythm.      Pulses: Normal pulses.   Pulmonary:      Effort: Pulmonary effort is normal. No respiratory distress.      Breath sounds: Normal breath sounds.   Abdominal:      " General: Abdomen is flat. Bowel sounds are normal. There is no distension.      Palpations: Abdomen is soft.      Tenderness: There is no abdominal tenderness.   Musculoskeletal:         General: Tenderness and signs of injury present. No swelling or deformity. Normal range of motion.      Cervical back: Normal range of motion and neck supple. No rigidity or tenderness.      Comments: Patient with tenderness to the fourth digit, no obvious deformity noted, no swelling noted   Skin:     General: Skin is warm and dry.      Capillary Refill: Capillary refill takes less than 2 seconds.   Neurological:      General: No focal deficit present.      Mental Status: He is alert and oriented to person, place, and time. Mental status is at baseline.      Cranial Nerves: No cranial nerve deficit.      Sensory: No sensory deficit.      Motor: No weakness.      Coordination: Coordination normal.   Psychiatric:         Mood and Affect: Mood normal.         Behavior: Behavior normal.         Thought Content: Thought content normal.         Judgment: Judgment normal.         Splint - Cast - Strapping    Date/Time: 1/19/2023 11:55 AM  Performed by: Tommy Howard MD  Authorized by: Tommy Howard MD     Consent:     Consent obtained:  Verbal    Consent given by:  Patient    Risks discussed:  Numbness, pain and swelling    Alternatives discussed:  No treatment  Universal protocol:     Patient identity confirmed:  Verbally with patient and arm band  Pre-procedure details:     Distal neurologic exam:  Normal    Distal perfusion: brisk capillary refill    Procedure details:     Location:  Finger    Finger location:  L ring finger    Cast type:  Finger    Splint type:  Finger    Attestation: Splint applied and adjusted personally by me    Post-procedure details:     Distal neurologic exam:  Normal    Procedure completion:  Tolerated well, no immediate complications    Post-procedure imaging: not applicable          ED  Course:         Lab Results (last 24 hours)     ** No results found for the last 24 hours. **           XR Finger 2+ View Left    Result Date: 1/19/2023  PROCEDURE: XR FINGER 2+ VW LEFT-  HISTORY: trauma, pain 4th digit, eval dislocation, fx  COMPARISON: None.  FINDINGS:  A three view exam demonstrates no  dislocation. The joint spaces appear unremarkable. Mild soft tissue swelling proximal left fourth digit noted. There is a tiny bony fragment adjacent to the lateral base of the fourth middle phalanx seen on the AP and oblique views but not the lateral view. This may represent a tiny avulsion fracture.      Impression: Suspect tiny avulsion fracture from the lateral base left fourth middle phalanx    This report was signed and finalized on 1/19/2023 9:11 AM by Ana Jones MD.         [facility]    Initial impression of presenting illness: Finger pain    DDX: includes but is not limited to: Anger dislocation, fractured phalange, wrist injury, other traumatic injury    Patient arrives stable with vitals interpreted by myself.     Pertinent features from physical exam: Nontender to palpation to the anatomic snuffbox, nontender to the elbow, patient with full range of motion of the fourth digit.    Initial diagnostic plan: Plain film of the digit    Results from initial plan were reviewed and interpreted by me revealing fracture to the middle phalanx of the fourth digit    Diagnostic information from other sources: None    Interventions / Re-evaluation: Splint placed, patient with normal function    Results/clinical rationale were discussed with patient who voiced understanding, will place referral for Ortho follow-up.  Patient voiced understanding and agreement with this plan    Consultations/Discussion of results with other physicians: Sent referral for orthopedics for patient to follow-up to ensure adequate healing after splinting    Disposition plan: Discharge  -----    Final diagnoses:   Closed avulsion fracture of  middle phalanx of finger, initial encounter        Edge, Tommy Wise MD  01/19/23 6102

## 2023-01-19 NOTE — Clinical Note
[facility] EMERGENCY DEPARTMENT  [address] [facility] [address]  Phone: [phone]    Shon Adames was seen and treated in our emergency department on 1/19/2023.  He may return to work on 01/20/2023.         Thank you for choosing [facility].    Tommy Howard MD

## 2023-01-19 NOTE — DISCHARGE INSTRUCTIONS
You were evaluated for finger pain.  We got an x-ray which showed that you had a fracture of the middle bone of your finger.  We placed you in a finger splint which he should continue to wear to help promote healing.  You can take Tylenol and ibuprofen at home for pain.  We have also sent a referral for you to follow-up with our orthopedic group.  Their contact information is in your discharge paperwork.  We will call schedule this appointment.  You are now stable for discharge.

## 2023-01-20 ENCOUNTER — OFFICE VISIT (OUTPATIENT)
Dept: ORTHOPEDIC SURGERY | Facility: CLINIC | Age: 48
End: 2023-01-20
Payer: COMMERCIAL

## 2023-01-20 VITALS — BODY MASS INDEX: 33.97 KG/M2 | HEIGHT: 72 IN | WEIGHT: 250.8 LBS | TEMPERATURE: 97.1 F

## 2023-01-20 DIAGNOSIS — S62.655A NONDISPLACED FRACTURE OF MIDDLE PHALANX OF LEFT RING FINGER, INITIAL ENCOUNTER FOR CLOSED FRACTURE: Primary | ICD-10-CM

## 2023-01-20 PROCEDURE — 99213 OFFICE O/P EST LOW 20 MIN: CPT | Performed by: ORTHOPAEDIC SURGERY

## 2023-02-16 DIAGNOSIS — S62.655A NONDISPLACED FRACTURE OF MIDDLE PHALANX OF LEFT RING FINGER, INITIAL ENCOUNTER FOR CLOSED FRACTURE: Primary | ICD-10-CM

## 2023-02-17 ENCOUNTER — OFFICE VISIT (OUTPATIENT)
Dept: ORTHOPEDIC SURGERY | Facility: CLINIC | Age: 48
End: 2023-02-17
Payer: COMMERCIAL

## 2023-02-17 VITALS — HEIGHT: 72 IN | WEIGHT: 249 LBS | BODY MASS INDEX: 33.72 KG/M2 | TEMPERATURE: 96.9 F

## 2023-02-17 DIAGNOSIS — S62.655A NONDISPLACED FRACTURE OF MIDDLE PHALANX OF LEFT RING FINGER, INITIAL ENCOUNTER FOR CLOSED FRACTURE: Primary | ICD-10-CM

## 2023-02-17 PROCEDURE — 73140 X-RAY EXAM OF FINGER(S): CPT | Performed by: ORTHOPAEDIC SURGERY

## 2023-02-17 PROCEDURE — 99213 OFFICE O/P EST LOW 20 MIN: CPT | Performed by: ORTHOPAEDIC SURGERY

## 2023-03-17 DIAGNOSIS — S62.655A NONDISPLACED FRACTURE OF MIDDLE PHALANX OF LEFT RING FINGER, INITIAL ENCOUNTER FOR CLOSED FRACTURE: Primary | ICD-10-CM

## 2023-03-20 ENCOUNTER — OFFICE VISIT (OUTPATIENT)
Dept: ORTHOPEDIC SURGERY | Facility: CLINIC | Age: 48
End: 2023-03-20
Payer: COMMERCIAL

## 2023-03-20 VITALS
SYSTOLIC BLOOD PRESSURE: 139 MMHG | BODY MASS INDEX: 33.64 KG/M2 | HEIGHT: 72 IN | DIASTOLIC BLOOD PRESSURE: 82 MMHG | WEIGHT: 248.4 LBS | HEART RATE: 100 BPM

## 2023-03-20 DIAGNOSIS — S62.655D CLOSED NONDISPLACED FRACTURE OF MIDDLE PHALANX OF LEFT RING FINGER WITH ROUTINE HEALING, SUBSEQUENT ENCOUNTER: ICD-10-CM

## 2023-03-20 PROCEDURE — 99213 OFFICE O/P EST LOW 20 MIN: CPT | Performed by: ORTHOPAEDIC SURGERY

## 2023-03-20 PROCEDURE — 73140 X-RAY EXAM OF FINGER(S): CPT | Performed by: ORTHOPAEDIC SURGERY

## 2023-03-20 NOTE — LETTER
2023    Shon Adames  [address] [facility] [address]      WORK/ACTIVITY STATUS    3/20/2023  Shon Adames  : 1975  [address] [place] [id number]    Return to regular duty work on _________________________________________    Return to regular sports, playground, gym activity on _______________________    Return to modified work on ________; with the temporary restriction marked (below)    Restrictions: Beginning_3/20/2023________/Effective until __2023__________    FUNCTIONAL ABILITY RESTRICTION/LIMITATIONS   []Heavy work. Lifting 100 pounds maximum with occasional lifting and/or carrying objects weighting up to 100 pounds.    []Medium work. Lifting 50 pounds maximum with occasional lifting and/or carrying objects weighting up to 50 pounds.    [x]Light work. Lifting 20 pounds maximum with occasional lifting and/or carrying of objects weighting up to 20 pounds.    []Sedentary work. Lifting 10 pounds maximum and occasionally lifting and/or carrying such articles ad dockets, ledgers, and small tools.  Jobs are sedentary if walking and standing are required only occasionally and other sedentary criteria are met.    []Lumbar/Spine restrictions.  Minimize repetitive bending and twisting through spine.  Alternate between sitting, standing, and walking with frequent changes in posture.  Avoid impact loading and vibration to spine.    []No use of affected extremity.    []Limitations of work hours/day:            []4 hrs []8 hrs []Unlimited  []Other:_____________________________  _____________________________________   1. Patient is able to:   None Occasional 1-33% Frequent 34-66% Constant  %   Sit []   []   []   []     Stand [] []  []  []   Walk []  [] []  []   Bend []  []  []  []    Squat [] []  []  []    Climb Ladder [x]   []  []  []    Stairs []  []  []  []      Weight Bearing Status:  []No Weight  []Partial  []Full    2. Patient is able to use upper extremities for  repetitive:    None Occasional  1-33% Frequent  34-66% Constant %   Grasping []  []  []   []     Fine Manipulation []    []  []  []    Push/Pull []  []  []  []    Above Shoulder Work []    []    []    []      Lifting []    []    []    []        __________________ Maximum lbs.    []Other:____________________________________________  ___________________________________________________   IF THE EMPLOYER IS UNABLE TO ACCOMMODATE THE RESTRICTIONS, THE EMPLOYEE IS OFF WORK  UNTIL THE NEXT M.D. APPOINTMENT  FOLLOW UP APPT. DATE:___________________________________                              Pankaj Spring MD

## 2023-04-02 NOTE — PROGRESS NOTES
Subjective   Patient ID: Shon Adames is a 48 y.o. right hand dominant male is here today for follow-up for fracture recovery.  Follow-up of the Left Hand (Nondisplaced fracture of middle phalanx of left ring finger DOI: 1/19/23, still has occasional pain with movement, states he still sleeps with finger splint )      CHIEF COMPLAINT:    Fracture follow up evaluation    History of Present Illness    Pain controlled: [] no   [x] yes   Medication refill requested: [x] no   [] yes    Patient compliant with instructions: [] no   [x] yes   Other: He notes good gradual progress with physical therapy.  There is milder residual stiffness of the ring finger.  He was compliant with custom splint and recommended exercises.  No development of any deformity.     Past Medical History:   Diagnosis Date   • Fracture 2020    Right hand 5th metatarsal   • Gout    • Kidney infection         Past Surgical History:   Procedure Laterality Date   • AMPUTATION Left 2011    left pinky tip from injury at work   • APPENDECTOMY     • BICEPS TENODESIS Right 06/2022    [facility]   • DENTAL PROCEDURE          Family History   Problem Relation Age of Onset   • Arthritis Mother    • Diabetes Mother    • Lupus Mother    • Heart attack Mother    • Hypertension Mother    • Hyperlipidemia Mother    • Cancer Paternal Grandfather        Social History     Socioeconomic History   • Marital status:    Tobacco Use   • Smoking status: Former   • Smokeless tobacco: Never   Substance and Sexual Activity   • Alcohol use: Yes     Comment: rarely   • Drug use: Yes     Types: Marijuana   • Sexual activity: Defer       No Known Allergies    Review of Systems   Constitutional: Negative for fever.   Musculoskeletal: Positive for arthralgias. Negative for gait problem and joint swelling.   Skin: Negative for color change and rash.   Neurological: Negative for weakness.   Psychiatric/Behavioral: Negative for confusion.     I have reviewed the medical and  "surgical history, family history, social history, medications, and/or allergies, and the review of systems of this report.    Objective   /82 (BP Location: Right arm, Patient Position: Sitting, Cuff Size: Adult)   Pulse 100   Ht 182.9 cm (72\")   Wt 113 kg (248 lb 6.4 oz)   BMI 33.69 kg/m²      Signs of infection: [x] no                  [] yes   Swelling: [x] no                  [] yes   Skin wound: [] healing well   [] healed well   [x] skin intact   Motor exam intact: [] no                  [x] yes   Neurovascular exam intact: [] no                  [x] yes   Signs of compartment syndrome: [x] no                  [] yes   Signs of DVT: [x] no                  [] yes   Other:      Physical Exam  Ortho Exam  Neurologic Exam    Assessment & Plan     Independent Review of Radiographic Studies:    AP, oblique and lateral of the left ring finger, indication to evaluate fracture healing, and compared with prior imaging, shows interm fracture healing, callus and or periostitis in continued good position and alignment.    Laboratory and Other Studies:  No new results reviewed today.     Medical Decision Making:    Stable neurovascular and fracture follow-up exam.  Physical and occupational therapy and advance to work simulations..  Activity, work and/or sports restrictions as appropriate.   Work status form completed and today's visit.  Patient advanced to light duty level with 20 pound limit.  Anticipate possible return to full regular duty at [employer] after his next visit in 4 weeks.     Procedures    Diagnoses and all orders for this visit:    1. Closed nondisplaced fracture of middle phalanx of left ring finger with routine healing, subsequent encounter       Physical therapy: [] rehab facility    [] home-based    [x] outpatient referral   Ultrasound: [x]not ordered         []order given to patient   Labs: [x]not ordered         []order given to patient   Weight Bearing status: []Full []WBAT [x]PWB []NWB " []Other     Discussion of orthopaedic goals and activities and patient expressed appreciation.  Regular exercise as tolerated  Guided on proper techniques for mobility, strength, agility and/or conditioning exercises  Splint care and usage instructions given  Weight bearing parameters reviewed  Physical therapy program ongoing  Work status from completed.    Recommendations/Plan:  Exercise, medications, injections, other patient advice, and return appointment as noted.  Brace: No brace was given at today's visit.  Test/Studies: No additional studies ordered at this time.  Work/Activity Status: Usual activities, routine exercise as tolerated, light physical work as tolerated, no strenuous activity. Work status form provided to patient. Light duty.     Return in about 4 weeks (around 4/17/2023) for Recheck, no XOA.  Patient is encouraged and agreeable to call or return sooner for any issues or concerns.

## 2023-04-17 ENCOUNTER — OFFICE VISIT (OUTPATIENT)
Dept: ORTHOPEDIC SURGERY | Facility: CLINIC | Age: 48
End: 2023-04-17
Payer: COMMERCIAL

## 2023-04-17 VITALS
BODY MASS INDEX: 32.91 KG/M2 | DIASTOLIC BLOOD PRESSURE: 70 MMHG | HEIGHT: 72 IN | TEMPERATURE: 98.7 F | SYSTOLIC BLOOD PRESSURE: 122 MMHG | WEIGHT: 243 LBS

## 2023-04-17 DIAGNOSIS — S62.655D CLOSED NONDISPLACED FRACTURE OF MIDDLE PHALANX OF LEFT RING FINGER WITH ROUTINE HEALING, SUBSEQUENT ENCOUNTER: Primary | ICD-10-CM

## 2023-04-17 NOTE — PROGRESS NOTES
Subjective   Patient ID: Shon Adames is a 48 y.o. right hand dominant male is here today for follow-up for fracture recovery.  Follow-up and Fracture of the Left Hand (S/P  Closed nondisplaced fracture of middle phalanx of left ring finger. DOI: 1/19/23)       CHIEF COMPLAINT:    Fracture follow up evaluation    History of Present Illness    Pain controlled: [] no   [x] yes   Medication refill requested: [x] no   [] yes    Patient compliant with instructions: [] no   [x] yes   Other: Occasional pain, has pain only if he hits his finger on a hard surface.  Swelling has subsided, he has regained full mobility and strength and there is no deformity of the digit other than the expected mild residual fullness at the PIP joint.  He has completed outpatient physical therapy and following a home exercise program. He is currently on short term disability that is due to end in two days on April 19, 2023. He feels capable to return to his full duty work with no restrictions at that time.  Patient is a  for [employer].     Past Medical History:   Diagnosis Date   • Fracture 2020    Right hand 5th metatarsal   • Gout    • Kidney infection         Past Surgical History:   Procedure Laterality Date   • AMPUTATION Left 2011    left pinky tip from injury at work   • APPENDECTOMY     • BICEPS TENODESIS Right 06/2022    [facility]   • DENTAL PROCEDURE          Family History   Problem Relation Age of Onset   • Arthritis Mother    • Diabetes Mother    • Lupus Mother    • Heart attack Mother    • Hypertension Mother    • Hyperlipidemia Mother    • Cancer Paternal Grandfather        Social History     Socioeconomic History   • Marital status:    Tobacco Use   • Smoking status: Former   • Smokeless tobacco: Never   Substance and Sexual Activity   • Alcohol use: Yes     Comment: rarely   • Drug use: Yes     Types: Marijuana   • Sexual activity: Defer       No Known Allergies    Review of Systems   Constitutional: Negative  "for fever.   Musculoskeletal: Positive for arthralgias (slight to mild, occasional pain if he bumps the finger. ) and joint swelling (mild residual PIP joint swelling). Negative for gait problem.   Skin: Negative for color change and rash.   Neurological: Negative for weakness.   Psychiatric/Behavioral: Negative for confusion.     I have reviewed the medical and surgical history, family history, social history, medications, and/or allergies, and the review of systems of this report.    Objective   /70   Temp 98.7 °F (37.1 °C)   Ht 182.9 cm (72\")   Wt 110 kg (243 lb)   BMI 32.96 kg/m²      Signs of infection: [x] no                  [] yes   Swelling: [x] no                  [] yes   Skin wound: [] healing well   [] healed well   [x] skin intact   Motor exam intact: [] no                  [x] yes   Neurovascular exam intact: [] no                  [x] yes   Signs of compartment syndrome: [x] no                  [] yes   Signs of DVT: [x] no                  [] yes   Other: No deformity good finger flexion and extension at DIP, PIP and MCP joints full claw and fist mobility.  No deformity of finger.     Physical Exam  Ortho Exam  Neurologic Exam    Assessment & Plan     Independent Review of Radiographic Studies:    No new imaging done today.    Laboratory and Other Studies:  No new results reviewed today.     Medical Decision Making:    Stable neurovascular and fracture follow-up exam.  Excellent progress.  Routine activity, work, sports and/or exercise as tolerated.  Patient may return to full activities and regular duty work as tolerated.     Procedures    Diagnoses and all orders for this visit:    1. Closed nondisplaced fracture of middle phalanx of left ring finger with routine healing, subsequent encounter (Primary)       Physical therapy: [] rehab facility    [x] home-based    [x] outpatient completed   Ultrasound: [x]not ordered         []order given to patient   Labs: [x]not ordered         []order " given to patient   Weight Bearing status: [x]Full []WBAT []PWB []NWB []Other     Discussion of orthopaedic goals and activities and patient expressed appreciation.  Regular exercise as tolerated  Guided on proper techniques for mobility, strength, agility and/or conditioning exercises  Physical therapy program ongoing  Work status form completed at today's visit.    Recommendations/Plan:  Exercise, medications, injections, other patient advice, and return appointment as noted.  Brace: No brace was given at today's visit.  Referral: No referrals made at today's visit.  Test/Studies: No additional studies ordered at this time.  Work/Activity Status: Usual activities, routine exercise as tolerated, routine physical work as tolerated. Work status form provided to patient. Regular duty.     Return if symptoms worsen or fail to improve.  Patient is encouraged and agreeable to call or return sooner for any issues or concerns.

## 2023-04-17 NOTE — LETTER
April 17, 2023     Patient: Shon Adames   YOB: 1975   Date of Visit: 4/17/2023       To Whom It May Concern:    It is my medical opinion that Shon Adames may return to full duty April 19, 2023 with no restrictions.           Sincerely,        Pankaj Spring MD

## 2024-06-13 ENCOUNTER — OFFICE VISIT (OUTPATIENT)
Dept: ORTHOPEDIC SURGERY | Facility: CLINIC | Age: 49
End: 2024-06-13
Payer: COMMERCIAL

## 2024-06-13 VITALS — TEMPERATURE: 98.6 F | HEIGHT: 72 IN | WEIGHT: 248 LBS | BODY MASS INDEX: 33.59 KG/M2

## 2024-06-13 DIAGNOSIS — M77.12 LATERAL EPICONDYLITIS OF LEFT ELBOW: ICD-10-CM

## 2024-06-13 DIAGNOSIS — M25.522 ARTHRALGIA OF LEFT ELBOW: Primary | ICD-10-CM

## 2024-06-13 PROCEDURE — 99213 OFFICE O/P EST LOW 20 MIN: CPT | Performed by: PHYSICIAN ASSISTANT

## 2024-06-13 NOTE — PATIENT INSTRUCTIONS
Obesity, Adult  Obesity is the condition of having too much total body fat. Being overweight or obese means that your weight is greater than what is considered healthy for your body size. Obesity is determined by a measurement called BMI (body mass index). BMI is an estimate of body fat and is calculated from height and weight. For adults, a BMI of 30 or higher is considered obese.  Obesity can lead to other health concerns and major illnesses, including:  Stroke.  Coronary artery disease (CAD).  Type 2 diabetes.  Some types of cancer, including cancers of the colon, breast, uterus, and gallbladder.  High blood pressure (hypertension).  High cholesterol.  Gallbladder stones.  Obesity can also contribute to:  Osteoarthritis.  Sleep apnea.  Infertility problems.  What are the causes?  Common causes of this condition include:  Eating daily meals that are high in calories, sugar, and fat.  Drinking high amounts of sugar-sweetened beverages, such as soft drinks.  Being born with genes that may make you more likely to become obese.  Having a medical condition that causes obesity, including:  Hypothyroidism.  Polycystic ovarian syndrome (PCOS).  Binge-eating disorder.  Cushing syndrome.  Taking certain medicines, such as steroids, antidepressants, and seizure medicines.  Not being physically active (sedentary lifestyle).  Not getting enough sleep.  What increases the risk?  The following factors may make you more likely to develop this condition:  Having a family history of obesity.  Living in an area with limited access to:  [place], recreation centers, or sidewalks.  Healthy food choices, such as grocery stores and [employer] markets.  What are the signs or symptoms?  The main sign of this condition is having too much body fat.  How is this diagnosed?  This condition is diagnosed based on:  Your BMI. If you are an adult with a BMI of 30 or higher, you are considered obese.  Your waist circumference. This measures the  distance around your waistline.  Your skinfold thickness. Your health care provider may gently pinch a fold of your skin and measure it.  You may have other tests to check for underlying conditions.  How is this treated?  Treatment for this condition often includes changing your lifestyle. Treatment may include some or all of the following:  Dietary changes. This may include developing a healthy meal plan.  Regular physical activity. This may include activity that causes your heart to beat faster (aerobic exercise) and strength training. Work with your health care provider to design an exercise program that works for you.  Medicine to help you lose weight if you are unable to lose one pound a week after six weeks of healthy eating and more physical activity.  Treating conditions that cause the obesity (underlying conditions).  Surgery. Surgical options may include gastric banding and gastric bypass. Surgery may be done if:  Other treatments have not helped to improve your condition.  You have a BMI of 40 or higher.  You have life-threatening health problems related to obesity.  Follow these instructions at home:  Eating and drinking    Follow recommendations from your health care provider about what you eat and drink. Your health care provider may advise you to:  Limit fast food, sweets, and processed snack foods.  Choose low-fat options, such as low-fat milk instead of whole milk.  Eat five or more servings of fruits or vegetables every day.  Choose healthy foods when you eat out.  Keep low-fat snacks available.  Limit sugary drinks, such as soda, fruit juice, sweetened iced tea, and flavored milk.  Drink enough water to keep your urine pale yellow.  Do not follow a fad diet. Fad diets can be unhealthy and even dangerous.  Other healthful choices include:  Eat at home more often. This gives you more control over what you eat.  Learn to read food labels. This will help you understand how much food is considered one  serving.  Learn what a healthy serving size is.  Physical activity  Exercise regularly, as told by your health care provider.  Most adults should get up to 150 minutes of moderate-intensity exercise every week.  Ask your health care provider what types of exercise are safe for you and how often you should exercise.  Warm up and stretch before being active.  Cool down and stretch after being active.  Rest between periods of activity.  Lifestyle  Work with your health care provider and a dietitian to set a weight-loss goal that is healthy and reasonable for you.  Limit your screen time.  Find ways to reward yourself that do not involve food.  Do not drink alcohol if:  Your health care provider tells you not to drink.  You are pregnant, may be pregnant, or are planning to become pregnant.  If you drink alcohol:  Limit how much you have to:  0-1 drink a day for women.  0-2 drinks a day for men.  Know how much alcohol is in your drink. In the [place] one drink equals one 12 oz bottle of beer (355 mL), one 5 oz glass of wine (148 mL), or one 1½ oz glass of hard liquor (44 mL).  General instructions  Keep a weight-loss journal to keep track of the food you eat and how much exercise you get.  Take over-the-counter and prescription medicines only as told by your health care provider.  Take vitamins and supplements only as told by your health care provider.  Consider joining a support group. Your health care provider may be able to recommend a support group.  Pay attention to your mental health as obesity can lead to depression or self esteem issues.  Keep all follow-up visits. This is important.  Contact a health care provider if:  You are unable to meet your weight-loss goal after six weeks of dietary and lifestyle changes.  You have trouble breathing.  Summary  Obesity is the condition of having too much total body fat.  Being overweight or obese means that your weight is greater than what is considered healthy for your body  size.  Work with your health care provider and a dietitian to set a weight-loss goal that is healthy and reasonable for you.  Exercise regularly, as told by your health care provider. Ask your health care provider what types of exercise are safe for you and how often you should exercise.  This information is not intended to replace advice given to you by your health care provider. Make sure you discuss any questions you have with your health care provider.  Document Revised: 07/26/2022 Document Reviewed: 07/26/2022  [employer] Patient Education © 2024 [employer]    Exercising to Lose Weight  Getting regular exercise is important for everyone. It is especially important if you are overweight. Being overweight increases your risk of heart disease, stroke, diabetes, high blood pressure, and several types of cancer. Exercising, and reducing the calories you consume, can help you lose weight and improve fitness and health.  Exercise can be moderate or vigorous intensity. To lose weight, most people need to do a certain amount of moderate or vigorous-intensity exercise each week.  How can exercise affect me?  You lose weight when you exercise enough to burn more calories than you eat. Exercise also reduces body fat and builds muscle. The more muscle you have, the more calories you burn. Exercise also:  Improves mood.  Reduces stress and tension.  Improves your overall fitness, flexibility, and endurance.  Increases bone strength.  Moderate-intensity exercise    Moderate-intensity exercise is any activity that gets you moving enough to burn at least three times more energy (calories) than if you were sitting.  Examples of moderate exercise include:  Walking a mile in 15 minutes.  Doing light yard work.  Biking at an easy pace.  Most people should get at least 150 minutes of moderate-intensity exercise a week to maintain their body weight.  Vigorous-intensity exercise  Vigorous-intensity exercise is any activity that gets  you moving enough to burn at least six times more calories than if you were sitting. When you exercise at this intensity, you should be working hard enough that you are not able to carry on a conversation.  Examples of vigorous exercise include:  Running.  Playing a team sport, such as football, basketball, and soccer.  Jumping rope.  Most people should get at least 75 minutes a week of vigorous exercise to maintain their body weight.  What actions can I take to lose weight?  The amount of exercise you need to lose weight depends on:  Your age.  The type of exercise.  Any health conditions you have.  Your overall physical ability.  Talk to your health care provider about how much exercise you need and what types of activities are safe for you.  Nutrition    Make changes to your diet as told by your health care provider or diet and nutrition specialist (dietitian). This may include:  Eating fewer calories.  Eating more protein.  Eating less unhealthy fats.  Eating a diet that includes fresh fruits and vegetables, whole grains, low-fat dairy products, and lean protein.  Avoiding foods with added fat, salt, and sugar.  Drink plenty of water while you exercise to prevent dehydration or heat stroke.  Activity  Choose an activity that you enjoy and set realistic goals. Your health care provider can help you make an exercise plan that works for you.  Exercise at a moderate or vigorous intensity most days of the week.  The intensity of exercise may vary from person to person. You can tell how intense a workout is for you by paying attention to your breathing and heartbeat. Most people will notice their breathing and heartbeat get faster with more intense exercise.  Do resistance training twice each week, such as:  Push-ups.  Sit-ups.  Lifting weights.  Using resistance bands.  Getting short amounts of exercise can be just as helpful as long, structured periods of exercise. If you have trouble finding time to exercise, try  doing these things as part of your daily routine:  Get up, stretch, and walk around every 30 minutes throughout the day.  Go for a walk during your lunch break.  Park your car farther away from your destination.  If you take public transportation, get off one stop early and walk the rest of the way.  Make phone calls while standing up and walking around.  Take the stairs instead of elevators or escalators.  Wear comfortable clothes and shoes with good support.  Do not exercise so much that you hurt yourself, feel dizzy, or get very short of breath.  Where to find more information  [place] Department of Health and Human Services: www.hhs.gov  Centers for Disease Control and Prevention: www.cdc.gov  Contact a health care provider:  Before starting a new exercise program.  If you have questions or concerns about your weight.  If you have a medical problem that keeps you from exercising.  Get help right away if:  You have any of the following while exercising:  Injury.  Dizziness.  Difficulty breathing or shortness of breath that does not go away when you stop exercising.  Chest pain.  Rapid heartbeat.  These symptoms may represent a serious problem that is an emergency. Do not wait to see if the symptoms will go away. Get medical help right away. Call your local emergency services (911 in the [place] Do not drive yourself to the hospital.  Summary  Getting regular exercise is especially important if you are overweight.  Being overweight increases your risk of heart disease, stroke, diabetes, high blood pressure, and several types of cancer.  Losing weight happens when you burn more calories than you eat.  Reducing the amount of calories you eat, and getting regular moderate or vigorous exercise each week, helps you lose weight.  This information is not intended to replace advice given to you by your health care provider. Make sure you discuss any questions you have with your health care provider.  Document Revised:  02/13/2022 Document Reviewed: 02/13/2022  [employer] Patient Education © 2024 [employer]    MyPlate from [employer]    MyPlate is an outline of a general healthy diet based on the Dietary Guidelines for Americans, 7204-5411, from the [place] Department of Agriculture (USDA). It sets guidelines for how much food you should eat from each food group based on your age, sex, and level of physical activity.  What are tips for following MyPlate?  To follow MyPlate recommendations:  Eat a wide variety of fruits and vegetables, grains, and protein foods.  Serve smaller portions and eat less food throughout the day.  Limit portion sizes to avoid overeating.  Enjoy your food.  Get at least 150 minutes of exercise every week. This is about 30 minutes each day, 5 or more days per week.  It can be difficult to have every meal look like MyPlate. Think about MyPlate as eating guidelines for an entire day, rather than each individual meal.  Fruits and vegetables  Make one half of your plate fruits and vegetables.  Eat many different colors of fruits and vegetables each day.  For a 2,000-calorie daily food plan, eat:  2½ cups of vegetables every day.  2 cups of fruit every day.  1 cup is equal to:  1 cup raw or cooked vegetables.  1 cup raw fruit.  1 medium-sized orange, apple, or banana.  1 cup 100% fruit or vegetable juice.  2 cups raw leafy greens, such as lettuce, spinach, or kale.  ½ cup dried fruit.  Grains  One fourth of your plate should be grains.  Make at least half of the grains you eat each day whole grains.  For a 2,000-calorie daily food plan, eat 6 oz of grains every day.  1 oz is equal to:  1 slice bread.  1 cup cereal.  ½ cup cooked rice, cereal, or pasta.  Protein  One fourth of your plate should be protein.  Eat a wide variety of protein foods, including meat, poultry, fish, eggs, beans, nuts, and tofu.  For a 2,000-calorie daily food plan, eat 5½ oz of protein every day.  1 oz is equal to:  1 oz meat, poultry, or fish.  ¼  cup cooked beans.  1 egg.  ½ oz nuts or seeds.  1 Tbsp peanut butter.  Dairy  Drink fat-free or low-fat (1%) milk.  Eat or drink dairy as a side to meals.  For a 2,000-calorie daily food plan, eat or drink 3 cups of dairy every day.  1 cup is equal to:  1 cup milk, yogurt, cottage cheese, or soy milk (soy beverage).  2 oz processed cheese.  1½ oz natural cheese.  Fats, oils, salt, and sugars  Only small amounts of oils are recommended.  Avoid foods that are high in calories and low in nutritional value (empty calories), like foods high in fat or added sugars.  Choose foods that are low in salt (sodium). Choose foods that have less than 140 milligrams (mg) of sodium per serving.  Drink water instead of sugary drinks. Drink enough fluid to keep your urine pale yellow.  Where to find support  Work with your health care provider or a dietitian to develop a customized eating plan that is right for you.  Download an haroon (mobile application) to help you track your daily food intake.  Where to find more information  USDA: ChooseMyPlate.gov  Summary  MyPlate is a general guideline for healthy eating from the USDA. It is based on the Dietary Guidelines for Americans, 3971-0490.  In general, fruits and vegetables should take up one half of your plate, grains should take up one fourth of your plate, and protein should take up one fourth of your plate.  This information is not intended to replace advice given to you by your health care provider. Make sure you discuss any questions you have with your health care provider.  Document Revised: 11/08/2021 Document Reviewed: 11/08/2021  [employer] Patient Education © 2024 [employer]

## 2024-06-13 NOTE — PROGRESS NOTES
Subjective   Patient ID: Shon Adames is a 49 y.o. right hand dominant male  Pain of the Left Elbow (States he has been having pain for about a month, no known injury.)         Pain  Associated symptoms include arthralgias (left elbow) and weakness (left arm). Pertinent negatives include no abdominal pain, chest pain, fever, joint swelling or rash.     Patient presents with 1 month of left lateral elbow pain that often radiates into the right third digit.  No injury or trauma.  He endorses that as a  he does significant heavy lifting, pushing and pulling.  There is no numbness or tingling to the left arm.    He has tried an over-the-counter elbow counterforce brace.  Pain Score: 8  Pain Location: Elbow  Pain Orientation: Left     Pain Descriptors: Aching, Sharp  Pain Frequency: Constant/continuous  Pain Onset: Sudden     Clinical Progression: Gradually worsening              Result of Injury: No  Work-Related Injury: No    Past Medical History:   Diagnosis Date    Fracture 2020    Right hand 5th metatarsal    Gout     Kidney infection         Past Surgical History:   Procedure Laterality Date    AMPUTATION Left 2011    left pinky tip from injury at work    APPENDECTOMY      BICEPS TENODESIS Right 06/2022    [facility]    DENTAL PROCEDURE         Family History   Problem Relation Age of Onset    Arthritis Mother     Diabetes Mother     Lupus Mother     Heart attack Mother     Hypertension Mother     Hyperlipidemia Mother     Cancer Paternal Grandfather        Social History     Socioeconomic History    Marital status:    Tobacco Use    Smoking status: Former     Passive exposure: Past    Smokeless tobacco: Never   Vaping Use    Vaping status: Never Used   Substance and Sexual Activity    Alcohol use: Yes     Comment: rarely    Drug use: Yes     Types: Marijuana    Sexual activity: Defer       No current outpatient medications on file.    No Known Allergies    Review of Systems   Constitutional:   "Negative for fever.   HENT:  Negative for dental problem and voice change.    Eyes:  Negative for visual disturbance.   Respiratory:  Negative for shortness of breath.    Cardiovascular:  Negative for chest pain.   Gastrointestinal:  Negative for abdominal pain.   Genitourinary:  Negative for dysuria.   Musculoskeletal:  Positive for arthralgias (left elbow). Negative for gait problem and joint swelling.   Skin:  Negative for rash.   Neurological:  Positive for weakness (left arm). Negative for speech difficulty.   Hematological:  Does not bruise/bleed easily.   Psychiatric/Behavioral:  Negative for confusion.        I have reviewed the medical and surgical history, family history, social history, medications, and/or allergies, and the review of systems of this report.    Objective   Temp 98.6 °F (37 °C)   Ht 182.9 cm (72.01\")   Wt 112 kg (248 lb)   BMI 33.63 kg/m²    Physical Exam  Vitals and nursing note reviewed.   Constitutional:       Appearance: Normal appearance.   Pulmonary:      Effort: Pulmonary effort is normal.   Musculoskeletal:      Left elbow: No deformity or effusion. Normal range of motion. Tenderness present in lateral epicondyle. No radial head, medial epicondyle or olecranon process tenderness.      Left forearm: No bony tenderness.      Left wrist: No snuff box tenderness or crepitus. Normal pulse.   Neurological:      Mental Status: He is alert and oriented to person, place, and time.       Left Elbow Exam     Muscle Strength   The patient has normal left elbow strength.    Tests   Varus: negative  Valgus: negative  Tinel's sign (cubital tunnel): negative    Other   Erythema: absent  Sensation: normal  Pulse: present             Neurologic Exam     Mental Status   Oriented to person, place, and time.               Assessment & Plan   Independent Review of Radiographic Studies:    X-ray of the left elbow 3 view performed in the clinic independently reviewed for the evaluation of pain.  No " comparison films available to review.  No acute fracture or dislocation.      Procedures       Diagnoses and all orders for this visit:    1. Arthralgia of left elbow (Primary)  -     XR Elbow 3+ View Left; Future    2. Lateral epicondylitis of left elbow       Orthopedic activities reviewed and patient expressed appreciation  Risk, benefits, and merits of treatment alternatives reviewed with the patient and questions answered  Ice, heat, and/or modalities as beneficial  Use brace as instructed    Recommendations/Plan:  Exercise, medications, injections, other patient advice, and return appointment as noted.  Patient is encouraged to call or return for any issues or concerns.  Discussed with the patient the importance of avoiding any heavy lifting, pushing or pulling with the left upper extremity x 7 to 10 days.  No follow-ups on file.  Patient agreeable to call or return sooner for any concerns.      Follow up in 3 months                 EMR Dragon-transcription disclaimer:  This encounter note is an electronic transcription of spoken language to printed text.  Electronic transcription of spoken language may permit erroneous or at times nonsensical words or phrases to be inadvertently transcribed.  Although I have reviewed the note for such errors, some may still exist

## 2024-12-31 ENCOUNTER — OFFICE VISIT (OUTPATIENT)
Dept: ORTHOPEDIC SURGERY | Facility: CLINIC | Age: 49
End: 2024-12-31
Payer: COMMERCIAL

## 2024-12-31 VITALS
DIASTOLIC BLOOD PRESSURE: 84 MMHG | SYSTOLIC BLOOD PRESSURE: 130 MMHG | WEIGHT: 247 LBS | BODY MASS INDEX: 33.46 KG/M2 | HEIGHT: 72 IN

## 2024-12-31 DIAGNOSIS — M25.522 ARTHRALGIA OF LEFT ELBOW: ICD-10-CM

## 2024-12-31 DIAGNOSIS — M77.12 LATERAL EPICONDYLITIS OF LEFT ELBOW: Primary | ICD-10-CM

## 2024-12-31 RX ORDER — TRIAMCINOLONE ACETONIDE 40 MG/ML
0.5 INJECTION, SUSPENSION INTRA-ARTICULAR; INTRAMUSCULAR
Status: COMPLETED | OUTPATIENT
Start: 2024-12-31 | End: 2024-12-31

## 2024-12-31 RX ORDER — LIDOCAINE HYDROCHLORIDE 20 MG/ML
0.5 INJECTION, SOLUTION INFILTRATION; PERINEURAL
Status: COMPLETED | OUTPATIENT
Start: 2024-12-31 | End: 2024-12-31

## 2024-12-31 RX ORDER — AZITHROMYCIN 250 MG/1
TABLET, FILM COATED ORAL
COMMUNITY
Start: 2024-12-28

## 2024-12-31 RX ORDER — ALBUTEROL SULFATE 90 UG/1
INHALANT RESPIRATORY (INHALATION)
COMMUNITY
Start: 2024-12-28

## 2024-12-31 RX ADMIN — LIDOCAINE HYDROCHLORIDE 0.5 ML: 20 INJECTION, SOLUTION INFILTRATION; PERINEURAL at 08:47

## 2024-12-31 RX ADMIN — TRIAMCINOLONE ACETONIDE 0.5 ML: 40 INJECTION, SUSPENSION INTRA-ARTICULAR; INTRAMUSCULAR at 08:47

## 2024-12-31 NOTE — PROGRESS NOTES
"Subjective   Patient ID: Shon Adames is a 49 y.o. right hand dominant male is being seen for orthopaedic evaluation today for left elbow pain   Follow-up of the Left Elbow (Reports elbow was doing good but  4 wks ago started having pain again. Was unable to use arm at times)         History of Present Illness                                                    Past Medical History:   Diagnosis Date   • Fracture     Right hand 5th metatarsal   • Gout    • Kidney infection         Past Surgical History:   Procedure Laterality Date   • AMPUTATION Left     left pinky tip from injury at work   • APPENDECTOMY     • [facility]IS [facility]   • DENTAL PROCEDURE         Family History   Problem Relation Age of Onset   • Arthritis Mother    • Diabetes Mother    • Lupus Mother    • Heart attack Mother    • Hypertension Mother    • Hyperlipidemia Mother    • Cancer Paternal Grandfather         Social History     Socioeconomic History   • Marital status:    Tobacco Use   • Smoking status: Former     Passive exposure: Past   • Smokeless tobacco: Never   Vaping Use   • Vaping status: Never Used   Substance and Sexual Activity   • Alcohol use: Yes     Comment: rarely   • Drug use: Yes     Types: Marijuana   • Sexual activity: Defer       No Known Allergies    Review of Systems   Musculoskeletal:  Positive for arthralgias (left elbow).     Objective   Ht 182.9 cm (72.01\")   Wt 112 kg (247 lb)   BMI 33.49 kg/m²   Physical Exam  Ortho Exam  Extremity DVT signs are {16TC Norma:31795}   Neurological Exam   [unfilled]      Assessment & Plan   Independent Review of Radiographic Studies:    {TC Imagin}      Procedures      There are no diagnoses linked to this encounter.   {16TC Patient Advice:90874::\"Discussion of orthopedic goals\",\"Orthopedic activities reviewed and patient expressed appreciation\",\"Regular exercise as tolerated\",\"Risk, benefits, and merits of treatment alternatives reviewed " "with the patient and questions answered\"}    Recommendations/Plan:  {16TC Recommendations/Plan:23700::\"Exercise, medications, injections, other patient advice, and return appointment as noted.\",\"Patient is encouraged to call or return for any issues or concerns.\"}    No follow-ups on file.  Patient agreeable to call or return sooner for any concerns.    {BMI is >= 30 and <35. (Class 1 Obesity). The following options were offered after discussion; (Optional):33479}          "

## 2024-12-31 NOTE — PROGRESS NOTES
"Subjective   Shon Adames is a 49 y.o. male here today for injection therapy.    Chief Complaint   Patient presents with    Left Elbow - Injections     Would like repeat injection    Patient would like to repeat a left tennis elbow cortisone injection.  He states he has had a cortisone injection in the remote past and obtained good relief from the injection.  He mentions that he is off work for the next 5 days.    Past Medical History:   Diagnosis Date    Fracture 2020    Right hand 5th metatarsal    Gout     Kidney infection          Past Surgical History:   Procedure Laterality Date    AMPUTATION Left 2011    left pinky tip from injury at work    APPENDECTOMY      BICEPS [facility]ODESIS [facility]/2022    [facility]    DENTAL PROCEDURE         No Known Allergies    Objective   /84   Ht 182.9 cm (72.01\")   Wt 112 kg (247 lb)   BMI 33.49 kg/m²    Physical Exam  + ttp left lateral condyle of elbow.  Skin exam stable with no erythema, ecchymosis or rash.  No new swelling.  No motor or sensory changes.  Distal pulse intact.    Left elbow lateral epicondyle injection    Date/Time: 12/31/2024 8:47 AM    Performed by: Miquel Bernard PA-C  Authorized by: Miquel Bernard PA-C  Consent: Verbal consent obtained. Written consent obtained.  Risks and benefits: risks, benefits and alternatives were discussed  Consent given by: patient  Patient understanding: patient states understanding of the procedure being performed  Patient consent: the patient's understanding of the procedure matches consent given  Procedure consent: procedure consent matches procedure scheduled  Relevant documents: relevant documents present and verified  Test results: test results available and properly labeled  Site marked: the operative site was marked  Imaging studies: imaging studies available  Patient identity confirmed: verbally with patient  Time out: Immediately prior to procedure a \"time out\" was called to verify the correct " patient, procedure, equipment, support staff and site/side marked as required.  Preparation: Patient was prepped and draped in the usual sterile fashion.  Local anesthesia used: yes    Anesthesia:  Local anesthesia used: yes  Local Anesthetic: lidocaine spray    Sedation:  Patient sedated: no    Patient tolerance: patient tolerated the procedure well with no immediate complications  Medications administered: 0.5 mL lidocaine 2%; 0.5 mL triamcinolone acetonide 40 MG/ML        Assessment & Plan      Diagnosis Plan   1. Lateral epicondylitis of left elbow        2. Arthralgia of left elbow            Ice, heat, and/or modalities as beneficial  Watch for signs and symptoms of infection  Call or notify for any adverse effect from injection therapy    Recommendations:  Follow up in 4 months or prn  Patient agreeable to call or return sooner for any concerns.

## 2025-04-27 ENCOUNTER — APPOINTMENT (OUTPATIENT)
Dept: CT IMAGING | Facility: HOSPITAL | Age: 50
End: 2025-04-27
Payer: COMMERCIAL

## 2025-04-27 ENCOUNTER — APPOINTMENT (OUTPATIENT)
Dept: GENERAL RADIOLOGY | Facility: HOSPITAL | Age: 50
End: 2025-04-27
Payer: COMMERCIAL

## 2025-04-27 ENCOUNTER — HOSPITAL ENCOUNTER (EMERGENCY)
Facility: HOSPITAL | Age: 50
Discharge: HOME | End: 2025-04-27
Attending: EMERGENCY MEDICINE | Admitting: EMERGENCY MEDICINE
Payer: COMMERCIAL

## 2025-04-27 VITALS
BODY MASS INDEX: 33.13 KG/M2 | WEIGHT: 250 LBS | TEMPERATURE: 98 F | HEART RATE: 79 BPM | HEIGHT: 73 IN | DIASTOLIC BLOOD PRESSURE: 91 MMHG | RESPIRATION RATE: 16 BRPM | OXYGEN SATURATION: 96 % | SYSTOLIC BLOOD PRESSURE: 131 MMHG

## 2025-04-27 DIAGNOSIS — R07.9 CHEST PAIN, UNSPECIFIED TYPE: Primary | ICD-10-CM

## 2025-04-27 DIAGNOSIS — R10.10 PAIN OF UPPER ABDOMEN: ICD-10-CM

## 2025-04-27 LAB
ALBUMIN SERPL-MCNC: 4.5 G/DL (ref 3.5–5.2)
ALBUMIN/GLOB SERPL: 1.3 G/DL
ALP SERPL-CCNC: 81 U/L (ref 39–117)
ALT SERPL W P-5'-P-CCNC: 86 U/L (ref 1–41)
ANION GAP SERPL CALCULATED.3IONS-SCNC: 11.3 MMOL/L (ref 5–15)
APTT PPP: 20.1 SECONDS (ref 70–100)
AST SERPL-CCNC: 61 U/L (ref 1–40)
BASOPHILS # BLD AUTO: 0.04 10*3/MM3 (ref 0–0.2)
BASOPHILS NFR BLD AUTO: 0.5 % (ref 0–1.5)
BILIRUB SERPL-MCNC: 0.5 MG/DL (ref 0–1.2)
BUN SERPL-MCNC: 10 MG/DL (ref 6–20)
BUN/CREAT SERPL: 11.5 (ref 7–25)
CALCIUM SPEC-SCNC: 10.6 MG/DL (ref 8.6–10.5)
CHLORIDE SERPL-SCNC: 101 MMOL/L (ref 98–107)
CO2 SERPL-SCNC: 26.7 MMOL/L (ref 22–29)
CREAT SERPL-MCNC: 0.87 MG/DL (ref 0.76–1.27)
D-LACTATE SERPL-SCNC: 1.7 MMOL/L (ref 0.5–2)
D-LACTATE SERPL-SCNC: 2.1 MMOL/L (ref 0.5–2)
DEPRECATED RDW RBC AUTO: 42.1 FL (ref 37–54)
EGFRCR SERPLBLD CKD-EPI 2021: 105.8 ML/MIN/1.73
EOSINOPHIL # BLD AUTO: 0.27 10*3/MM3 (ref 0–0.4)
EOSINOPHIL NFR BLD AUTO: 3.3 % (ref 0.3–6.2)
ERYTHROCYTE [DISTWIDTH] IN BLOOD BY AUTOMATED COUNT: 12.8 % (ref 12.3–15.4)
GEN 5 1HR TROPONIN T REFLEX: <6 NG/L
GLOBULIN UR ELPH-MCNC: 3.6 GM/DL
GLUCOSE SERPL-MCNC: 193 MG/DL (ref 65–99)
HCT VFR BLD AUTO: 45 % (ref 37.5–51)
HGB BLD-MCNC: 15.1 G/DL (ref 13–17.7)
HOLD SPECIMEN: NORMAL
HOLD SPECIMEN: NORMAL
IMM GRANULOCYTES # BLD AUTO: 0.03 10*3/MM3 (ref 0–0.05)
IMM GRANULOCYTES NFR BLD AUTO: 0.4 % (ref 0–0.5)
INR PPP: 0.99 (ref 0.9–1.1)
LIPASE SERPL-CCNC: 95 U/L (ref 13–60)
LYMPHOCYTES # BLD AUTO: 3.24 10*3/MM3 (ref 0.7–3.1)
LYMPHOCYTES NFR BLD AUTO: 39.2 % (ref 19.6–45.3)
MAGNESIUM SERPL-MCNC: 2 MG/DL (ref 1.6–2.6)
MCH RBC QN AUTO: 30.1 PG (ref 26.6–33)
MCHC RBC AUTO-ENTMCNC: 33.6 G/DL (ref 31.5–35.7)
MCV RBC AUTO: 89.8 FL (ref 79–97)
MONOCYTES # BLD AUTO: 0.54 10*3/MM3 (ref 0.1–0.9)
MONOCYTES NFR BLD AUTO: 6.5 % (ref 5–12)
NEUTROPHILS NFR BLD AUTO: 4.14 10*3/MM3 (ref 1.7–7)
NEUTROPHILS NFR BLD AUTO: 50.1 % (ref 42.7–76)
NRBC BLD AUTO-RTO: 0 /100 WBC (ref 0–0.2)
PLATELET # BLD AUTO: 209 10*3/MM3 (ref 140–450)
PMV BLD AUTO: 10.5 FL (ref 6–12)
POTASSIUM SERPL-SCNC: 4.9 MMOL/L (ref 3.5–5.2)
PROT SERPL-MCNC: 8.1 G/DL (ref 6–8.5)
PROTHROMBIN TIME: 13.8 SECONDS (ref 12.3–15.1)
RBC # BLD AUTO: 5.01 10*6/MM3 (ref 4.14–5.8)
SODIUM SERPL-SCNC: 139 MMOL/L (ref 136–145)
TROPONIN T NUMERIC DELTA: NORMAL
TROPONIN T SERPL HS-MCNC: <6 NG/L
WBC NRBC COR # BLD AUTO: 8.26 10*3/MM3 (ref 3.4–10.8)
WHOLE BLOOD HOLD COAG: NORMAL
WHOLE BLOOD HOLD SPECIMEN: NORMAL

## 2025-04-27 PROCEDURE — 99285 EMERGENCY DEPT VISIT HI MDM: CPT | Performed by: EMERGENCY MEDICINE

## 2025-04-27 PROCEDURE — 83735 ASSAY OF MAGNESIUM: CPT | Performed by: EMERGENCY MEDICINE

## 2025-04-27 PROCEDURE — 85025 COMPLETE CBC W/AUTO DIFF WBC: CPT | Performed by: EMERGENCY MEDICINE

## 2025-04-27 PROCEDURE — 36415 COLL VENOUS BLD VENIPUNCTURE: CPT

## 2025-04-27 PROCEDURE — 96375 TX/PRO/DX INJ NEW DRUG ADDON: CPT

## 2025-04-27 PROCEDURE — 83605 ASSAY OF LACTIC ACID: CPT | Performed by: EMERGENCY MEDICINE

## 2025-04-27 PROCEDURE — 25010000002 KETOROLAC TROMETHAMINE PER 15 MG: Performed by: EMERGENCY MEDICINE

## 2025-04-27 PROCEDURE — 25510000001 IOPAMIDOL 61 % SOLUTION: Performed by: EMERGENCY MEDICINE

## 2025-04-27 PROCEDURE — 25010000002 ONDANSETRON PER 1 MG: Performed by: EMERGENCY MEDICINE

## 2025-04-27 PROCEDURE — 74175 CTA ABDOMEN W/CONTRAST: CPT

## 2025-04-27 PROCEDURE — 83690 ASSAY OF LIPASE: CPT | Performed by: EMERGENCY MEDICINE

## 2025-04-27 PROCEDURE — 71045 X-RAY EXAM CHEST 1 VIEW: CPT

## 2025-04-27 PROCEDURE — 96374 THER/PROPH/DIAG INJ IV PUSH: CPT

## 2025-04-27 PROCEDURE — 93005 ELECTROCARDIOGRAM TRACING: CPT | Performed by: EMERGENCY MEDICINE

## 2025-04-27 PROCEDURE — 85730 THROMBOPLASTIN TIME PARTIAL: CPT | Performed by: EMERGENCY MEDICINE

## 2025-04-27 PROCEDURE — 80053 COMPREHEN METABOLIC PANEL: CPT | Performed by: EMERGENCY MEDICINE

## 2025-04-27 PROCEDURE — 85610 PROTHROMBIN TIME: CPT | Performed by: EMERGENCY MEDICINE

## 2025-04-27 PROCEDURE — 84484 ASSAY OF TROPONIN QUANT: CPT | Performed by: EMERGENCY MEDICINE

## 2025-04-27 PROCEDURE — 25010000002 FENTANYL CITRATE (PF) 50 MCG/ML SOLUTION: Performed by: EMERGENCY MEDICINE

## 2025-04-27 PROCEDURE — 25010000002 MORPHINE PER 10 MG: Performed by: EMERGENCY MEDICINE

## 2025-04-27 RX ORDER — KETOROLAC TROMETHAMINE 30 MG/ML
15 INJECTION, SOLUTION INTRAMUSCULAR; INTRAVENOUS ONCE
Status: COMPLETED | OUTPATIENT
Start: 2025-04-27 | End: 2025-04-27

## 2025-04-27 RX ORDER — ASPIRIN 325 MG
325 TABLET ORAL ONCE
Status: COMPLETED | OUTPATIENT
Start: 2025-04-27 | End: 2025-04-27

## 2025-04-27 RX ORDER — ONDANSETRON 2 MG/ML
4 INJECTION INTRAMUSCULAR; INTRAVENOUS ONCE
Status: COMPLETED | OUTPATIENT
Start: 2025-04-27 | End: 2025-04-27

## 2025-04-27 RX ORDER — FENTANYL CITRATE 50 UG/ML
100 INJECTION, SOLUTION INTRAMUSCULAR; INTRAVENOUS ONCE
Refills: 0 | Status: COMPLETED | OUTPATIENT
Start: 2025-04-27 | End: 2025-04-27

## 2025-04-27 RX ORDER — SODIUM CHLORIDE 0.9 % (FLUSH) 0.9 %
10 SYRINGE (ML) INJECTION AS NEEDED
Status: DISCONTINUED | OUTPATIENT
Start: 2025-04-27 | End: 2025-04-27 | Stop reason: HOSPADM

## 2025-04-27 RX ORDER — IOPAMIDOL 612 MG/ML
100 INJECTION, SOLUTION INTRAVASCULAR
Status: COMPLETED | OUTPATIENT
Start: 2025-04-27 | End: 2025-04-27

## 2025-04-27 RX ADMIN — KETOROLAC TROMETHAMINE 15 MG: 30 INJECTION, SOLUTION INTRAMUSCULAR; INTRAVENOUS at 19:16

## 2025-04-27 RX ADMIN — MORPHINE SULFATE 4 MG: 4 INJECTION, SOLUTION INTRAMUSCULAR; INTRAVENOUS at 17:37

## 2025-04-27 RX ADMIN — ASPIRIN 325 MG: 325 TABLET ORAL at 17:32

## 2025-04-27 RX ADMIN — IOPAMIDOL 100 ML: 612 INJECTION, SOLUTION INTRAVENOUS at 17:49

## 2025-04-27 RX ADMIN — FENTANYL CITRATE 100 MCG: 50 INJECTION, SOLUTION INTRAMUSCULAR; INTRAVENOUS at 19:17

## 2025-04-27 RX ADMIN — ONDANSETRON 4 MG: 2 INJECTION INTRAMUSCULAR; INTRAVENOUS at 17:34

## 2025-04-27 NOTE — ED PROVIDER NOTES
EMERGENCY DEPARTMENT ENCOUNTER    Pt Name: Shon Adames  MRN: [id number]  Pt :   1975  Room Number:    Date of encounter:  2025  PCP: Amandeep Grey MD  ED Provider: Emanuel Sanchez MD    Historian: Patient      HPI:  Chief Complaint   Patient presents with    Chest Pain          Context: Shon Adames is a 50 y.o. male who presents to the ED c/o chest pain, located in the lower chest, rating to the back, severe, started 50 minutes prior to arrival.  Reports has never had pain like this before.  Denies nausea, vomiting.  Denies taking any medications.  No leg swelling or recent travel.      PAST MEDICAL HISTORY  Past Medical History:   Diagnosis Date    Fracture     Right hand 5th metatarsal    Gout     Kidney infection          PAST SURGICAL HISTORY  Past Surgical History:   Procedure Laterality Date    AMPUTATION Left     left pinky tip from injury at work    APPENDECTOMY      BICEPS TENODESIS Right 2022    [facility]    DENTAL PROCEDURE           FAMILY HISTORY  Family History   Problem Relation Age of Onset    Arthritis Mother     Diabetes Mother     Lupus Mother     Heart attack Mother     Hypertension Mother     Hyperlipidemia Mother     Cancer Paternal Grandfather          SOCIAL HISTORY  Social History     Socioeconomic History    Marital status:    Tobacco Use    Smoking status: Former     Passive exposure: Past    Smokeless tobacco: Never   Vaping Use    Vaping status: Never Used   Substance and Sexual Activity    Alcohol use: Yes     Comment: rarely    Drug use: Yes     Types: Marijuana    Sexual activity: Defer         ALLERGIES  Patient has no known allergies.        REVIEW OF SYSTEMS  Review of Systems   Constitutional:  Negative for chills and fever.   HENT:  Negative for sore throat and trouble swallowing.    Eyes:  Negative for pain and redness.   Respiratory:  Negative for cough and shortness of breath.    Cardiovascular:  Positive for chest pain.  Negative for leg swelling.   Gastrointestinal:  Negative for abdominal pain, nausea and vomiting.   Genitourinary:  Negative for dysuria and urgency.   Musculoskeletal:  Negative for back pain and neck pain.   Skin:  Negative for rash and wound.   Neurological:  Negative for dizziness and weakness.        All systems reviewed and negative except for those discussed in HPI.       PHYSICAL EXAM    I have reviewed the triage vital signs and nursing notes.    ED Triage Vitals [04/27/25 1713]   Temp Heart Rate Resp BP SpO2   97.9 °F (36.6 °C) 98 20 (!) 158/120 98 %      Temp src Heart Rate Source Patient Position BP Location FiO2 (%)   -- -- -- -- --       Physical Exam  Constitutional:       Appearance: Normal appearance. He is not ill-appearing.      Comments: Uncomfortable appearing middle-age male, no acute respiratory distress   HENT:      Head: Normocephalic and atraumatic.      Right Ear: External ear normal.      Left Ear: External ear normal.      Nose: Nose normal.      Mouth/Throat:      Mouth: Mucous membranes are moist.      Pharynx: Oropharynx is clear.   Eyes:      Extraocular Movements: Extraocular movements intact.      Conjunctiva/sclera: Conjunctivae normal.      Pupils: Pupils are equal, round, and reactive to light.   Cardiovascular:      Rate and Rhythm: Normal rate and regular rhythm.      Pulses:           Radial pulses are 2+ on the right side and 2+ on the left side.      Heart sounds: No murmur heard.  Pulmonary:      Effort: Pulmonary effort is normal.      Breath sounds: Normal breath sounds.   Abdominal:      General: There is no distension.      Tenderness: There is no abdominal tenderness. There is no guarding.   Musculoskeletal:         General: No swelling or deformity.      Cervical back: Normal range of motion and neck supple.   Skin:     General: Skin is warm and dry.      Capillary Refill: Capillary refill takes less than 2 seconds.      Findings: No rash.   Neurological:       General: No focal deficit present.      Mental Status: He is oriented to person, place, and time.            LAB RESULTS  Recent Results (from the past 24 hours)   Comprehensive Metabolic Panel    Collection Time: 04/27/25  5:29 PM    Specimen: Blood   Result Value Ref Range    Glucose 193 (H) 65 - 99 mg/dL    BUN 10 6 - 20 mg/dL    Creatinine 0.87 0.76 - 1.27 mg/dL    Sodium 139 136 - 145 mmol/L    Potassium 4.9 3.5 - 5.2 mmol/L    Chloride 101 98 - 107 mmol/L    CO2 26.7 22.0 - 29.0 mmol/L    Calcium 10.6 (H) 8.6 - 10.5 mg/dL    Total Protein 8.1 6.0 - 8.5 g/dL    Albumin 4.5 3.5 - 5.2 g/dL    ALT (SGPT) 86 (H) 1 - 41 U/L    AST (SGOT) 61 (H) 1 - 40 U/L    Alkaline Phosphatase 81 39 - 117 U/L    Total Bilirubin 0.5 0.0 - 1.2 mg/dL    Globulin 3.6 gm/dL    A/G Ratio 1.3 g/dL    BUN/Creatinine Ratio 11.5 7.0 - 25.0    Anion Gap 11.3 5.0 - 15.0 mmol/L    eGFR 105.8 >60.0 mL/min/1.73   High Sensitivity Troponin T    Collection Time: 04/27/25  5:29 PM    Specimen: Blood   Result Value Ref Range    HS Troponin T <6 <22 ng/L   Green Top (Gel)    Collection Time: 04/27/25  5:29 PM   Result Value Ref Range    Extra Tube Hold for add-ons.    Lavender Top    Collection Time: 04/27/25  5:29 PM   Result Value Ref Range    Extra Tube hold for add-on    Gold Top - SST    Collection Time: 04/27/25  5:29 PM   Result Value Ref Range    Extra Tube Hold for add-ons.    Light Blue Top    Collection Time: 04/27/25  5:29 PM   Result Value Ref Range    Extra Tube Hold for add-ons.    CBC Auto Differential    Collection Time: 04/27/25  5:29 PM    Specimen: Blood   Result Value Ref Range    WBC 8.26 3.40 - 10.80 10*3/mm3    RBC 5.01 4.14 - 5.80 10*6/mm3    Hemoglobin 15.1 13.0 - 17.7 g/dL    Hematocrit 45.0 37.5 - 51.0 %    MCV 89.8 79.0 - 97.0 fL    MCH 30.1 26.6 - 33.0 pg    MCHC 33.6 31.5 - 35.7 g/dL    RDW 12.8 12.3 - 15.4 %    RDW-SD 42.1 37.0 - 54.0 fl    MPV 10.5 6.0 - 12.0 fL    Platelets 209 140 - 450 10*3/mm3    Neutrophil % 50.1  42.7 - 76.0 %    Lymphocyte % 39.2 19.6 - 45.3 %    Monocyte % 6.5 5.0 - 12.0 %    Eosinophil % 3.3 0.3 - 6.2 %    Basophil % 0.5 0.0 - 1.5 %    Immature Grans % 0.4 0.0 - 0.5 %    Neutrophils, Absolute 4.14 1.70 - 7.00 10*3/mm3    Lymphocytes, Absolute 3.24 (H) 0.70 - 3.10 10*3/mm3    Monocytes, Absolute 0.54 0.10 - 0.90 10*3/mm3    Eosinophils, Absolute 0.27 0.00 - 0.40 10*3/mm3    Basophils, Absolute 0.04 0.00 - 0.20 10*3/mm3    Immature Grans, Absolute 0.03 0.00 - 0.05 10*3/mm3    nRBC 0.0 0.0 - 0.2 /100 WBC   Lactic Acid, Plasma    Collection Time: 04/27/25  5:29 PM    Specimen: Blood   Result Value Ref Range    Lactate 2.1 (C) 0.5 - 2.0 mmol/L   Magnesium    Collection Time: 04/27/25  5:29 PM    Specimen: Blood   Result Value Ref Range    Magnesium 2.0 1.6 - 2.6 mg/dL   Protime-INR    Collection Time: 04/27/25  5:29 PM    Specimen: Blood   Result Value Ref Range    Protime 13.8 12.3 - 15.1 Seconds    INR 0.99 0.90 - 1.10   aPTT    Collection Time: 04/27/25  5:29 PM    Specimen: Blood   Result Value Ref Range    PTT 20.1 (L) 70.0 - 100.0 seconds   High Sensitivity Troponin T 1Hr    Collection Time: 04/27/25  6:41 PM    Specimen: Blood   Result Value Ref Range    HS Troponin T <6 <22 ng/L    Troponin T Numeric Delta     STAT Lactic Acid, Reflex    Collection Time: 04/27/25  6:41 PM    Specimen: Blood   Result Value Ref Range    Lactate 1.7 0.5 - 2.0 mmol/L   Lipase    Collection Time: 04/27/25  6:41 PM    Specimen: Blood   Result Value Ref Range    Lipase 95 (H) 13 - 60 U/L       If labs were ordered, I independently reviewed the results and considered them in treating the patient.        RADIOLOGY  CT Angiogram Aorta  Result Date: 4/27/2025  FINAL REPORT TECHNIQUE: null CLINICAL HISTORY: severe chest pain, back pain, hypertension COMPARISON: null FINDINGS: CT angiography of the chest and abdomen with IV contrast. COMPARISON: None FINDINGS: No evidence of aortic dissection. Visualized portions of the major  cervical arteries are patent. Celiac trunk, SMA, renal arteries and SELENE are widely patent. Central pulmonary arteries are without evidence of embolism. Visualized thyroid is unremarkable. No supraclavicular or axillary lymphadenopathy. Ascending aorta and main pulmonary artery are normal in caliber. No pericardial effusion. Normal esophagus. Multiple normal-sized mediastinal lymph nodes. No pleural effusion. No consolidation. Trachea and central airways are clear. No significant bronchial wall thickening. No bronchiectasis. Bilateral pulmonary nodules. For example, right upper lobe 5 mm pulmonary nodule (series 4, image 46). Right middle lobe fissural 5 mm pulmonary nodule (series 4, image 133). Left lower lobe 4 mm pulmonary nodule (series 4, image 151). Hepatic steatosis. Normal gallbladder. Normal spleen. Normal pancreas. Normal adrenal glands. Symmetric renal enhancement. No hydronephrosis. Visualized portions of the bowel in the upper abdomen are unremarkable. No retroperitoneal or mesenteric lymphadenopathy. No acute fracture or suspicious bone lesion.     IMPRESSION: 1. No evidence of aortic dissection. No central pulmonary embolism. 2. No acute intrathoracic findings. No evidence of pneumonia. 3. Bilateral pulmonary nodules measuring up to 5 mm. Recommend comparison with prior imaging if available. If none available, consider follow-up imaging in 12 months. Authenticated and Electronically Signed by Huan Loyola MD on 04/27/2025 06:25:09 PM          PROCEDURES    Procedures    Interpretations    O2 Sat: The patient's oxygen saturation was 98% on Room Air.  This was independently interpreted by me as Normal    EKG: I reviewed and independently interpreted the EKG as sinus rhythm rate of 98 bpm, normal axis, normal intervals, no ST elevation, no T wave inversions    Cardiac Monitoring: I reviewed and independently interpreted the Rhythm Strip as Normal Sinus rhythm rate of 98    Radiology: I ordered and  independently reviewed the above noted radiographic studies.  I viewed images of CT Chest which showed No pulmonary process per my independent interpretation. See radiologist's dictation for official interpretation.     HEART Score: 3       MEDICATIONS GIVEN IN ER    Medications   sodium chloride 0.9 % flush 10 mL (has no administration in time range)   aspirin tablet 325 mg (325 mg Oral Given 4/27/25 1732)   morphine injection 4 mg (4 mg Intravenous Given 4/27/25 1737)   ondansetron (ZOFRAN) injection 4 mg (4 mg Intravenous Given 4/27/25 1734)   iopamidol (ISOVUE-300) 61 % injection 100 mL (100 mL Intravenous Given 4/27/25 1749)   ketorolac (TORADOL) injection 15 mg (15 mg Intravenous Given 4/27/25 1916)   fentaNYL citrate (PF) (SUBLIMAZE) injection 100 mcg (100 mcg Intravenous Given 4/27/25 1917)         MEDICAL DECISION MAKING, PROGRESS, and CONSULTS    All labs, if obtained, have been independently reviewed by me.  All radiology studies, if obtained, have been reviewed by me and the radiologist dictating the report.  All EKG's, if obtained, have been independently viewed and interpreted by me      Discussion below represents my analysis of pertinent findings related to patient's condition, differential diagnosis, treatment plan and final disposition.      Differential diagnosis:    50-year-old male presented ED with complaint of chest pain, he is hypertensive, he has a severe pain in his chest, concerning for possible aortic catastrophe, versus acute coronary syndrome.  I think is much less likely PE, he is PERC and Wells negative.  Will obtain CT of the aorta, laboratory workup, provide morphine, Zofran    Additional Sources:  External (non-ED) record review:  Primary care note 5/16/2024 regarding chronic conditions       Orders placed during this visit:  Orders Placed This Encounter   Procedures    XR Chest 1 View    CT Angiogram Aorta    [facility] Draw    Comprehensive Metabolic Panel    High Sensitivity  Troponin T    CBC Auto Differential    Lactic Acid, Plasma    Magnesium    Protime-INR    aPTT    High Sensitivity Troponin T 1Hr    STAT Lactic Acid, Reflex    Lipase    Ambulatory Referral to Cardiology    NPO Diet NPO Type: Strict NPO    Undress & Gown    Continuous Pulse Oximetry    Oxygen Therapy- Nasal Cannula; Titrate 1-6 LPM Per SpO2; 90 - 95%    ECG 12 Lead ED Triage Standing Order; Chest Pain    ECG 12 Lead ED Triage Standing Order; Chest Pain    Insert Peripheral IV    CBC & Differential    Green Top (Gel)    Lavender Top    Gold Top - SST    Light Blue Top         Additional orders considered but not ordered:  None    ED Course:    Consultants:  None    ED Course as of 04/27/25 2051   Sun Apr 27, 2025   1749 CBC & Differential(!)  CBC is unremarkable [CS]   1803 High Sensitivity Troponin T  Initial troponin level is negative, will repeat per high sensitivity troponin protocol [CS]   1843 Lactic Acid, Plasma(!!)  Lactic very mildly elevated, will provide small fluid bolus [CS]   1843 Comprehensive Metabolic Panel(!)  Chemistries are unremarkable [CS]   1907 STAT Lactic Acid, Reflex  Repeat lactic improved [CS]   1927 High Sensitivity Troponin T 1Hr  Repeat troponin similarly negative [CS]   2006 Patient's pain-free on reexamination, he has 2 negative troponins, CTA of the aorta was completely negative.  Unclear what caused the patient's pain but as I advised the patient does not appear to be acutely life-threatening, no MI, no aortic catastrophe.  Does not appear to have intra-abdominal catastrophe as well.  Will discharge the patient home with outpatient follow-up, will place referral to cardiology.  Patient and wife voiced understanding agreeable with the plan for discharge [CS]      ED Course User Index  [CS] Emanuel Sanchez MD           After my consideration of clinical presentation and any laboratory/radiology studies obtained, I discussed the findings with the patient/patient  representative who is in agreement with the treatment plan and the final disposition. Risks and benefits of discharge were discussed.     AS OF 20:51 EDT VITALS:    BP - 131/91  HR - 79  TEMP - 98 °F (36.7 °C) (Oral)  O2 SATS - 96%    I reviewed the patient's prescription monitoring report if available prior to discharge    DIAGNOSIS  Final diagnoses:   Chest pain, unspecified type   Pain of upper abdomen         DISPOSITION  ED Disposition       ED Disposition   Discharge    Condition   Stable    Comment   --                   Please note that portions of this document were completed with voice recognition software.        Emanuel Sanchez MD  04/27/25 4341

## 2025-04-29 ENCOUNTER — TELEPHONE (OUTPATIENT)
Dept: CARDIOLOGY | Facility: CLINIC | Age: 50
End: 2025-04-29
Payer: COMMERCIAL

## 2025-05-01 ENCOUNTER — TELEPHONE (OUTPATIENT)
Dept: CARDIOLOGY | Facility: CLINIC | Age: 50
End: 2025-05-01
Payer: COMMERCIAL